# Patient Record
Sex: FEMALE | Race: WHITE | Employment: OTHER | URBAN - METROPOLITAN AREA
[De-identification: names, ages, dates, MRNs, and addresses within clinical notes are randomized per-mention and may not be internally consistent; named-entity substitution may affect disease eponyms.]

---

## 2017-08-21 ENCOUNTER — TRANSFERRED RECORDS (OUTPATIENT)
Dept: HEALTH INFORMATION MANAGEMENT | Facility: CLINIC | Age: 47
End: 2017-08-21

## 2017-08-21 LAB — PAP SMEAR - HIM PATIENT REPORTED: NEGATIVE

## 2017-09-01 ENCOUNTER — OFFICE VISIT (OUTPATIENT)
Dept: FAMILY MEDICINE | Facility: CLINIC | Age: 47
End: 2017-09-01
Payer: COMMERCIAL

## 2017-09-01 ENCOUNTER — DOCUMENTATION ONLY (OUTPATIENT)
Dept: LAB | Facility: CLINIC | Age: 47
End: 2017-09-01

## 2017-09-01 VITALS
DIASTOLIC BLOOD PRESSURE: 106 MMHG | WEIGHT: 195.4 LBS | HEIGHT: 65 IN | TEMPERATURE: 97.8 F | BODY MASS INDEX: 32.55 KG/M2 | OXYGEN SATURATION: 97 % | HEART RATE: 79 BPM | SYSTOLIC BLOOD PRESSURE: 160 MMHG

## 2017-09-01 DIAGNOSIS — I10 ESSENTIAL HYPERTENSION: Primary | ICD-10-CM

## 2017-09-01 DIAGNOSIS — L65.9 ALOPECIA: ICD-10-CM

## 2017-09-01 DIAGNOSIS — D69.9 TENDENCY TOWARD BLEEDING EASILY (H): ICD-10-CM

## 2017-09-01 LAB
BASOPHILS # BLD AUTO: 0.1 10E9/L (ref 0–0.2)
BASOPHILS NFR BLD AUTO: 0.7 %
DIFFERENTIAL METHOD BLD: NORMAL
EOSINOPHIL # BLD AUTO: 0.2 10E9/L (ref 0–0.7)
EOSINOPHIL NFR BLD AUTO: 1.8 %
ERYTHROCYTE [DISTWIDTH] IN BLOOD BY AUTOMATED COUNT: 13 % (ref 10–15)
HCT VFR BLD AUTO: 37.5 % (ref 35–47)
HGB BLD-MCNC: 12.3 G/DL (ref 11.7–15.7)
LYMPHOCYTES # BLD AUTO: 2.6 10E9/L (ref 0.8–5.3)
LYMPHOCYTES NFR BLD AUTO: 27.6 %
MCH RBC QN AUTO: 29.4 PG (ref 26.5–33)
MCHC RBC AUTO-ENTMCNC: 32.8 G/DL (ref 31.5–36.5)
MCV RBC AUTO: 90 FL (ref 78–100)
MONOCYTES # BLD AUTO: 0.7 10E9/L (ref 0–1.3)
MONOCYTES NFR BLD AUTO: 7.5 %
NEUTROPHILS # BLD AUTO: 5.9 10E9/L (ref 1.6–8.3)
NEUTROPHILS NFR BLD AUTO: 62.4 %
PLATELET # BLD AUTO: 324 10E9/L (ref 150–450)
RBC # BLD AUTO: 4.19 10E12/L (ref 3.8–5.2)
WBC # BLD AUTO: 9.5 10E9/L (ref 4–11)

## 2017-09-01 PROCEDURE — 80048 BASIC METABOLIC PNL TOTAL CA: CPT | Performed by: INTERNAL MEDICINE

## 2017-09-01 PROCEDURE — 36415 COLL VENOUS BLD VENIPUNCTURE: CPT | Performed by: INTERNAL MEDICINE

## 2017-09-01 PROCEDURE — 83550 IRON BINDING TEST: CPT | Performed by: INTERNAL MEDICINE

## 2017-09-01 PROCEDURE — 85730 THROMBOPLASTIN TIME PARTIAL: CPT | Performed by: INTERNAL MEDICINE

## 2017-09-01 PROCEDURE — 85610 PROTHROMBIN TIME: CPT | Performed by: INTERNAL MEDICINE

## 2017-09-01 PROCEDURE — 99203 OFFICE O/P NEW LOW 30 MIN: CPT | Performed by: INTERNAL MEDICINE

## 2017-09-01 PROCEDURE — 84443 ASSAY THYROID STIM HORMONE: CPT | Performed by: INTERNAL MEDICINE

## 2017-09-01 PROCEDURE — 85025 COMPLETE CBC W/AUTO DIFF WBC: CPT | Performed by: INTERNAL MEDICINE

## 2017-09-01 PROCEDURE — 83540 ASSAY OF IRON: CPT | Performed by: INTERNAL MEDICINE

## 2017-09-01 PROCEDURE — 82728 ASSAY OF FERRITIN: CPT | Performed by: INTERNAL MEDICINE

## 2017-09-01 RX ORDER — VENLAFAXINE HYDROCHLORIDE 150 MG/1
150 CAPSULE, EXTENDED RELEASE ORAL DAILY
Qty: 90 CAPSULE | Refills: 1 | COMMUNITY
Start: 2017-09-01 | End: 2018-02-28 | Stop reason: DRUGHIGH

## 2017-09-01 RX ORDER — ARIPIPRAZOLE 15 MG/1
15 TABLET ORAL AT BEDTIME
Qty: 30 TABLET | Refills: 1 | COMMUNITY
Start: 2017-09-01

## 2017-09-01 ASSESSMENT — ENCOUNTER SYMPTOMS
BLOOD IN STOOL: 0
NAUSEA: 0
DIZZINESS: 0
SENSORY CHANGE: 0
VOMITING: 0
HEMATURIA: 0
SHORTNESS OF BREATH: 0
PND: 0
WEIGHT LOSS: 0
COUGH: 0
ORTHOPNEA: 0
ABDOMINAL PAIN: 0
PALPITATIONS: 0
BRUISES/BLEEDS EASILY: 1
HEADACHES: 0
FOCAL WEAKNESS: 0

## 2017-09-01 NOTE — PROGRESS NOTES
Lab received order for cbc, INR, and PTT. Unfortunately these orders were ordered a little after the previous ones, so we were unable to obtain specimens for these tests.   Please call patient to make a lab only apt if these orders are still needed.    Thanks

## 2017-09-01 NOTE — Clinical Note
Please abstract the following data from this visit with this patient into the appropriate field in Epic:  Pap smear done on this date: 8/21/17 (approximately), by this group: [facility], results were Normal.

## 2017-09-01 NOTE — PROGRESS NOTES
"PGEN study visit 1 NEW HYPERTENSION diagnosis (enrollment visit note)      SUBJECTIVE:  [facility]ans is [facility]. Please refer to research tab in the header and today's flow sheet for further details. Patient has new onset HYPERTENSION and has a goal of <140/90 (per Problem list target chosen by PCP)     PCP note reviewed. Patient is not on blood pressure medication(s) at this time.       BP Readings from Last 3 Encounters:   09/01/17 (!) 158/92       Current Outpatient Prescriptions   Medication Sig Dispense Refill     venlafaxine (EFFEXOR-XR) 150 MG 24 hr capsule Take 1 capsule (150 mg) by mouth daily 90 capsule 1     ARIPiprazole (ABILIFY) 15 MG tablet Take 1 tablet (15 mg) by mouth At Bedtime 30 tablet 1     Potassium   Date Value Ref Range Status   11/06/2009 3.6 3.4 - 5.3 mmol/L Final     Creatinine   Date Value Ref Range Status   11/08/2009 0.58 0.52 - 1.04 mg/dL Final     Comment:     New IDMS-traceable calibration  beginning 5/1/08     Urea Nitrogen   Date Value Ref Range Status   11/06/2009 7 5 - 24 mg/dL Final     GFR Estimate   Date Value Ref Range Status   11/08/2009 >90 >60 mL/min/1.7m2 Final      A baseline potassium, creatinine, BUN, GFR has not been done within past 12 months      OBJECTIVE:  Patient in in no apparent distress and able to provide full history for today's encounter. she denies pain or any current illness   Vitals: BP (!) 158/92 (BP Location: Left arm, Patient Position: Chair, Cuff Size: Adult Large)  Pulse 79  Temp 97.8  F (36.6  C) (Tympanic)  Ht 5' 4.5\" (1.638 m)  Wt 195 lb 6.4 oz (88.6 kg)  SpO2 97%  BMI 33.02 kg/m2  Today's BP completed using cuff size: regular on left side  arm.    Pulse Readings from Last 1 Encounters:   09/01/17 79     Is pulse 55 or greater? - Yes    BMI= Body mass index is 33.02 kg/(m^2).  See PGEN flow sheet for other exam findings.        ASSESSMENT/PLAN  (I10) Hypertension goal BP (blood pressure) < 140/90 (primary " encounter diagnosis)      Conducted first visit according to protocol. Explained research study and provided consent and HIPPA forms.  Patient consented to study with me as well as visit coordinator on the phone with us today.      Genetic samples using standard PGEN protocol were obtained and left with [facility] lab team. This will be picked up today.     Medications were NOT prescribed today. We will advise or Rx via [employer] in 2 weeks when result and genetic profile/randomization is complete.  Rx will be chosen based on genetic profile if patient is randomized to intervention group.  If patient is randomized to control group Rx will be chosen based on JNC 8 guidelines.     Patient should NOT be provided genetic profile results until the end of the study (this is a single blinded study.  Clinicians will use genetic profile (see media tab) results to chose order of blood pressure medications in patients randomized to the intervention arm.   Patients will remain blinded to results until the end of the study)    See avs for more details.  Full research packet also provide to patient .  Our research team will reach out to patient to schedule follow up visit as well.     Education:  written materials handout and general discussion/verbal explanation  Limit dietary sodium intake between 1500-2000mg every day  Regular aerobic exercise.  Discussed habit change regarding diet/weight loss  Education material provided and patient was given an opportunity to ask questions.    Patient verbalized understanding of this plan and is agreeable to continuing with this research study    Ortega Mooney

## 2017-09-01 NOTE — PROGRESS NOTES
"HPI Comments:   Patient comes in today to discuss multiple concerns.    Patient has noted that her blood pressure has been elevated whenever she checks-in at the dentist or at her weight loss management program. Has not been diagnosed in the past with hypertension. States that her mother has hypertension and is on medications for it.    Has also noticed diffuse hair loss.  Denies scalp pain, itching, rashes.    When her cheek was being swabbed for the PGen study, it bled easily and the patient states that she has a tendency towards bleeding easily but denies bleeding in her stool or urine.      Family History   Problem Relation Age of Onset     Hypertension Mother        Review of Systems   Constitutional: Positive for malaise/fatigue. Negative for weight loss.   HENT: Negative for nosebleeds.    Respiratory: Negative for cough and shortness of breath.    Cardiovascular: Negative for chest pain, palpitations, orthopnea, leg swelling and PND.   Gastrointestinal: Negative for abdominal pain, blood in stool, melena, nausea and vomiting.   Genitourinary: Negative for hematuria.   Neurological: Negative for dizziness, sensory change, focal weakness and headaches.   Endo/Heme/Allergies: Bruises/bleeds easily.       BP (!) 160/106 (BP Location: Right arm)  Pulse 79  Temp 97.8  F (36.6  C) (Tympanic)  Ht 5' 4.5\" (1.638 m)  Wt 195 lb 6.4 oz (88.6 kg)  SpO2 97%  BMI 33.02 kg/m2      Physical Exam   Constitutional: She is oriented to person, place, and time. No distress.   HENT:   No scalp erythema, scaling, rashes   Neck: No thyromegaly present.   Cardiovascular: Normal rate, regular rhythm and normal heart sounds.    Pulmonary/Chest: Effort normal and breath sounds normal. No respiratory distress.   Musculoskeletal: She exhibits no edema.   Neurological: She is alert and oriented to person, place, and time. Coordination normal. GCS score is 15.   Nursing note and vitals reviewed.      (I10) Essential hypertension  " (primary encounter diagnosis)  Comment: Patient enrolled in PGen study and will wait for results  Plan: Basic metabolic panel            (D69.9) Tendency toward bleeding easily (H)  Comment:   Plan: CBC with platelets differential, INR, Partial         thromboplastin time            (L65.9) Alopecia  Comment:   Plan: TSH with free T4 reflex, Ferritin, Iron and         iron binding capacity

## 2017-09-01 NOTE — NURSING NOTE
"Chief Complaint   Patient presents with     Hypertension     Breathing Problem     Snoring        Initial BP (!) 158/92 (BP Location: Left arm, Patient Position: Chair, Cuff Size: Adult Large)  Pulse 79  Temp 97.8  F (36.6  C) (Tympanic)  Ht 5' 4.5\" (1.638 m)  Wt 195 lb 6.4 oz (88.6 kg)  SpO2 97%  BMI 33.02 kg/m2 Estimated body mass index is 33.02 kg/(m^2) as calculated from the following:    Height as of this encounter: 5' 4.5\" (1.638 m).    Weight as of this encounter: 195 lb 6.4 oz (88.6 kg)..    BP completed using cuff size: large  MEDICATIONS REVIEWED  SOCIAL AND FAMILY HX REVIEWED  Helen Henderson CMA  "

## 2017-09-01 NOTE — PATIENT INSTRUCTIONS
"PGEN study visit 1 NEW HYPERTENSION diagnosis (enrollment visit note)      SUBJECTIVE:  Evans is here for first [facility] research study visit. Please refer to research tab in the header and today's flow sheet for further details. Patient has new onset HYPERTENSION and has a goal of <140/90 (per Problem list target chosen by PCP)     PCP note reviewed. Patient is not on blood pressure medication(s) at this time.       BP Readings from Last 3 Encounters:   09/01/17 (!) 160/106       Current Outpatient Prescriptions   Medication Sig Dispense Refill     venlafaxine (EFFEXOR-XR) 150 MG 24 hr capsule Take 1 capsule (150 mg) by mouth daily 90 capsule 1     ARIPiprazole (ABILIFY) 15 MG tablet Take 1 tablet (15 mg) by mouth At Bedtime 30 tablet 1     Potassium   Date Value Ref Range Status   11/06/2009 3.6 3.4 - 5.3 mmol/L Final     Creatinine   Date Value Ref Range Status   11/08/2009 0.58 0.52 - 1.04 mg/dL Final     Comment:     New IDMS-traceable calibration  beginning 5/1/08     Urea Nitrogen   Date Value Ref Range Status   11/06/2009 7 5 - 24 mg/dL Final     GFR Estimate   Date Value Ref Range Status   11/08/2009 >90 >60 mL/min/1.7m2 Final      A baseline potassium, creatinine, BUN, GFR has not been done within past 12 months      OBJECTIVE:  Patient in in no apparent distress and able to provide full history for today's encounter. she denies pain or any current illness   Vitals: BP (!) 160/106 (BP Location: Right arm)  Pulse 79  Temp 97.8  F (36.6  C) (Tympanic)  Ht 5' 4.5\" (1.638 m)  Wt 195 lb 6.4 oz (88.6 kg)  SpO2 97%  BMI 33.02 kg/m2  Today's BP completed using cuff size: regular on both sides arm.    Pulse Readings from Last 1 Encounters:   09/01/17 79     Is pulse 55 or greater? - Yes    BMI= Body mass index is 33.02 kg/(m^2).  See PGEN flow sheet for other exam findings.          ASSESSMENT/PLAN  (I10) Hypertension goal BP (blood pressure) < 140/90 (primary encounter diagnosis)      Conducted first visit " according to protocol. Explained research study and provided consent and HIPPA forms.  Patient consented to study with me as well as visit coordinator on the phone with us today.      Genetic samples using standard PGEN protocol were obtained and left with [facility] lab team. This will be picked up today.     Medications were NOT prescribed today. We will advise or Rx via [employer] in 2 weeks when result and genetic profile/randomization is complete.  Rx will be chosen based on genetic profile if patient is randomized to intervention group.  If patient is randomized to control group Rx will be chosen based on JNC 8 guidelines.     Patient should NOT be provided genetic profile results until the end of the study (this is a single blinded study.  Clinicians will use genetic profile (see media tab) results to chose order of blood pressure medications in patients randomized to the intervention arm.   Patients will remain blinded to results until the end of the study)    See avs for more details.  Full research packet also provide to patient .  Our research team will reach out to patient to schedule follow up visit as well.     Education:  written materials handout and general discussion/verbal explanation  Limit dietary sodium intake between 1500-2000mg every day  Regular aerobic exercise.  Discussed habit change regarding limit sodium   Education material provided and patient was given an opportunity to ask questions.    Patient verbalized understanding of this plan and is agreeable to continuing with this research study

## 2017-09-01 NOTE — MR AVS SNAPSHOT
"              After Visit Summary   9/1/2017    Evans Salinas    MRN: [id number]           Patient Information     Date Of Birth          1970        Visit Information        Provider Department      9/1/2017 10:30 AM Ortega Mooney MD [facility]        Today's Diagnoses     Essential hypertension    -  1      Care Instructions    PGEN study visit 1 NEW HYPERTENSION diagnosis (enrollment visit note)      SUBJECTIVE:  Evans is here for first PGEN research study visit. Please refer to research tab in the header and today's flow sheet for further details. Patient has new onset HYPERTENSION and has a goal of <140/90 (per Problem list target chosen by PCP)     PCP note reviewed. Patient is not on blood pressure medication(s) at this time.       BP Readings from Last 3 Encounters:   09/01/17 (!) 160/106       Current Outpatient Prescriptions   Medication Sig Dispense Refill     venlafaxine (EFFEXOR-XR) 150 MG 24 hr capsule Take 1 capsule (150 mg) by mouth daily 90 capsule 1     ARIPiprazole (ABILIFY) 15 MG tablet Take 1 tablet (15 mg) by mouth At Bedtime 30 tablet 1     Potassium   Date Value Ref Range Status   11/06/2009 3.6 3.4 - 5.3 mmol/L Final     Creatinine   Date Value Ref Range Status   11/08/2009 0.58 0.52 - 1.04 mg/dL Final     Comment:     New IDMS-traceable calibration  beginning 5/1/08     Urea Nitrogen   Date Value Ref Range Status   11/06/2009 7 5 - 24 mg/dL Final     GFR Estimate   Date Value Ref Range Status   11/08/2009 >90 >60 mL/min/1.7m2 Final      A baseline potassium, creatinine, BUN, GFR has not been done within past 12 months      OBJECTIVE:  Patient in in no apparent distress and able to provide full history for today's encounter. she denies pain or any current illness   Vitals: BP (!) 160/106 (BP Location: Right arm)  Pulse 79  Temp 97.8  F (36.6  C) (Tympanic)  Ht 5' 4.5\" (1.638 m)  Wt 195 lb 6.4 oz (88.6 kg)  SpO2 97%  BMI 33.02 kg/m2  Today's BP " completed using cuff size: regular on both sides arm.    Pulse Readings from Last 1 Encounters:   09/01/17 79     Is pulse 55 or greater? - Yes    BMI= Body mass index is 33.02 kg/(m^2).  See PGEN flow sheet for other exam findings.          ASSESSMENT/PLAN  (I10) Hypertension goal BP (blood pressure) < 140/90 (primary encounter diagnosis)      Conducted first visit according to protocol. Explained research study and provided consent and HIPPA forms.  Patient consented to study with me as well as visit coordinator on the phone with us today.      Genetic samples using standard PGEN protocol were obtained and left with [facility] lab team. This will be picked up today.     Medications were NOT prescribed today. We will advise or Rx via [employer] in 2 weeks when result and genetic profile/randomization is complete.  Rx will be chosen based on genetic profile if patient is randomized to intervention group.  If patient is randomized to control group Rx will be chosen based on JNC 8 guidelines.     Patient should NOT be provided genetic profile results until the end of the study (this is a single blinded study.  Clinicians will use genetic profile (see media tab) results to chose order of blood pressure medications in patients randomized to the intervention arm.   Patients will remain blinded to results until the end of the study)    See avs for more details.  Full research packet also provide to patient .  Our research team will reach out to patient to schedule follow up visit as well.     Education:  written materials handout and general discussion/verbal explanation  Limit dietary sodium intake between 1500-2000mg every day  Regular aerobic exercise.  Discussed habit change regarding limit sodium   Education material provided and patient was given an opportunity to ask questions.    Patient verbalized understanding of this plan and is agreeable to continuing with this research study                 "Follow-ups after your visit        Who to contact     If you have questions or need follow up information about today's clinic visit or your schedule please contact [facility] directly at [phone].  Normal or non-critical lab and imaging results will be communicated to you by MyChart, letter or phone within 4 business days after the clinic has received the results. If you do not hear from us within 7 days, please contact the clinic through MyChart or phone. If you have a critical or abnormal lab result, we will notify you by phone as soon as possible.  Submit refill requests through [employer] or call your pharmacy and they will forward the refill request to us. Please allow 3 business days for your refill to be completed.          Additional Information About Your Visit        [employer] Information     [employer] lets you send messages to your doctor, view your test results, renew your prescriptions, schedule appointments and more. To sign up, go to [email or web address] . Click on \"Log in\" on the left side of the screen, which will take you to the Welcome page. Then click on \"Sign up Now\" on the right side of the page.     You will be asked to enter the access code listed below, as well as some personal information. Please follow the directions to create your username and password.     Your access code is: [email or web address]  Expires: 2017 11:22 AM     Your access code will  in 90 days. If you need help or a new code, please call your [place] clinic or [phone].        Care EveryWhere ID     This is your Care EveryWhere ID. This could be used by other organizations to access your [place] medical records  [id number]        Your Vitals Were     Pulse Temperature Height Pulse Oximetry BMI (Body Mass Index)       79 97.8  F (36.6  C) (Tympanic) 5' 4.5\" (1.638 m) 97% 33.02 kg/m2        Blood Pressure from Last 3 Encounters:   17 (!) 160/106    Weight from Last 3 Encounters:   17 195 " lb 6.4 oz (88.6 kg)              Today, you had the following     No orders found for display       Primary Care Provider    None , MD       No address on file        Equal Access to Services     CARL MARTIMOI : Francine brandon miranda judith Ackerman, dashawn ramonfinn, bo kavikki reyes, samuel geurra. So [facility] [phone].    ATENCIÓN: Si habla español, tiene a goldstein disposición servicios gratuitos de asistencia lingüística. Llame al [phone].    We comply with applicable federal civil rights laws and [place] laws. We do not discriminate on the basis of race, color, national origin, age, disability sex, sexual orientation or gender identity.            Thank you!     Thank you for choosing [facility]  for your care. Our goal is always to provide you with excellent care. Hearing back from our patients is one way we can continue to improve our services. Please take a few minutes to complete the written survey that you may receive in the mail after your visit with us. Thank you!             Your Updated Medication List - Protect others around you: Learn how to safely use, store and throw away your medicines at www.disposemymeds.org.          This list is accurate as of: 9/1/17 11:33 AM.  Always use your most recent med list.                   Brand Name Dispense Instructions for use Diagnosis    ABILIFY 15 MG tablet   Generic drug:  ARIPiprazole     30 tablet    Take 1 tablet (15 mg) by mouth At Bedtime        venlafaxine 150 MG 24 hr capsule    EFFEXOR-XR    90 capsule    Take 1 capsule (150 mg) by mouth daily

## 2017-09-02 LAB
ANION GAP SERPL CALCULATED.3IONS-SCNC: 6 MMOL/L (ref 3–14)
APTT PPP: 31 SEC (ref 22–37)
BUN SERPL-MCNC: 8 MG/DL (ref 7–30)
CALCIUM SERPL-MCNC: 9.1 MG/DL (ref 8.5–10.1)
CHLORIDE SERPL-SCNC: 106 MMOL/L (ref 94–109)
CO2 SERPL-SCNC: 29 MMOL/L (ref 20–32)
CREAT SERPL-MCNC: 0.91 MG/DL (ref 0.52–1.04)
FERRITIN SERPL-MCNC: 14 NG/ML (ref 8–252)
GFR SERPL CREATININE-BSD FRML MDRD: 66 ML/MIN/1.7M2
GLUCOSE SERPL-MCNC: 80 MG/DL (ref 70–99)
INR PPP: 1.08 (ref 0.86–1.14)
IRON SATN MFR SERPL: 16 % (ref 15–46)
IRON SERPL-MCNC: 48 UG/DL (ref 35–180)
POTASSIUM SERPL-SCNC: 3.9 MMOL/L (ref 3.4–5.3)
SODIUM SERPL-SCNC: 141 MMOL/L (ref 133–144)
TIBC SERPL-MCNC: 308 UG/DL (ref 240–430)
TSH SERPL DL<=0.005 MIU/L-ACNC: 3.81 MU/L (ref 0.4–4)

## 2017-09-22 DIAGNOSIS — I10 ESSENTIAL HYPERTENSION: Primary | ICD-10-CM

## 2017-09-22 RX ORDER — LISINOPRIL/HYDROCHLOROTHIAZIDE 10-12.5 MG
1 TABLET ORAL DAILY
Qty: 30 TABLET | Refills: 1 | Status: SHIPPED | OUTPATIENT
Start: 2017-09-22 | End: 2017-10-11 | Stop reason: ALTCHOICE

## 2017-10-11 ENCOUNTER — ALLIED HEALTH/NURSE VISIT (OUTPATIENT)
Dept: FAMILY MEDICINE | Facility: CLINIC | Age: 47
End: 2017-10-11

## 2017-10-11 VITALS
DIASTOLIC BLOOD PRESSURE: 74 MMHG | HEART RATE: 74 BPM | WEIGHT: 193.5 LBS | SYSTOLIC BLOOD PRESSURE: 108 MMHG | BODY MASS INDEX: 32.24 KG/M2 | HEIGHT: 65 IN

## 2017-10-11 DIAGNOSIS — I10 ESSENTIAL HYPERTENSION: Primary | ICD-10-CM

## 2017-10-11 PROCEDURE — 99213 OFFICE O/P EST LOW 20 MIN: CPT | Performed by: INTERNAL MEDICINE

## 2017-10-11 RX ORDER — LISINOPRIL 10 MG/1
10 TABLET ORAL DAILY
Qty: 30 TABLET | Refills: 1 | Status: SHIPPED | OUTPATIENT
Start: 2017-10-11 | End: 2017-12-22

## 2017-10-11 NOTE — PATIENT INSTRUCTIONS
STOP taking the Lisinopril/Hydrochlorothiazide.    Start plain Lisinopril.  Call doctor if you develop any side effects from the medication.    Follow up in 2 weeks.

## 2017-10-11 NOTE — MR AVS SNAPSHOT
After Visit Summary   10/11/2017    Evans Salinas    MRN: [id number]           Patient Information     Date Of Birth          1970        Visit Information        Provider Department      10/11/2017 9:45 AM Ortega Mooney MD [facility]        Today's Diagnoses     Essential hypertension    -  1      Care Instructions    STOP taking the Lisinopril/Hydrochlorothiazide.    Start plain Lisinopril.  Call doctor if you develop any side effects from the medication.    Follow up in 2 weeks.          Follow-ups after your visit        Your next 10 appointments already scheduled     Nov 08, 2017  9:30 AM CST   Nurse Only with Ortega Mooney [facility]   [facility]    [address] [facility] [address] [address]   [phone]            Dec 06, 2017  9:30 AM CST   Nurse Only with Ortega Mooney MD   [facility]    [address] [address]   [phone]            Jan 17, 2018  8:45 AM CST   Nurse Only with Ortega Mooney MD   [facility]    [address] [place] [address]   [phone]            Feb 28, 2018  8:45 AM CST   Nurse Only with Ortega Mooney MD   [facility]    [address] [address]   [phone]            May 23, 2018  8:45 AM CDT   Nurse Only with Ortega Mooney MD   [facility]    [address] [address]   [phone]            Sep 12, 2018  8:45 AM CDT   Nurse Only with Ortega Mooney MD   [facility]    [address] [place] [address]   [phone]              Who to contact     If you have questions or need follow up information about today's clinic visit or  "your schedule please contact [facility] directly at [phone].  Normal or non-critical lab and imaging results will be communicated to you by MyChart, letter or phone within 4 business days after the clinic has received the results. If you do not hear from us within 7 days, please contact the clinic through [employer] or phone. If you have a critical or abnormal lab result, we will notify you by phone as soon as possible.  Submit refill requests through [employer] or call your pharmacy and they will forward the refill request to us. Please allow 3 business days for your refill to be completed.          Additional Information About Your Visit        [employer] Information     [employer] gives you secure access to your electronic health record. If you see a primary care provider, you can also send messages to your care team and make appointments. If you have questions, please call your primary care clinic.  If you do not have a primary care provider, please call [phone] and they will assist you.        Care EveryWhere ID     This is your Care EveryWhere ID. This could be used by other organizations to access your [facility] medical records  [id number]        Your Vitals Were     Pulse Height Breastfeeding? BMI (Body Mass Index)          74 5' 4.5\" (1.638 m) No 32.7 kg/m2         Blood Pressure from Last 3 Encounters:   10/11/17 108/74   09/01/17 (!) 160/106    Weight from Last 3 Encounters:   10/11/17 193 lb 8 oz (87.8 kg)   09/01/17 195 lb 6.4 oz (88.6 kg)              Today, you had the following     No orders found for display         Today's Medication Changes          These changes are accurate as of: 10/11/17 10:34 AM.  If you have any questions, ask your nurse or doctor.               Start taking these medicines.        Dose/Directions    lisinopril 10 MG tablet   Commonly known as:  PRINIVIL/ZESTRIL   Used for:  Essential hypertension   Started by:  Ortega Mooney MD        Dose:  " 10 mg   Take 1 tablet (10 mg) by mouth daily   Quantity:  30 tablet   Refills:  1         Stop taking these medicines if you haven't already. Please contact your care team if you have questions.     lisinopril-hydrochlorothiazide 10-12.5 MG per tablet   Commonly known as:  PRINZIDE/ZESTORETIC   Stopped by:  Ortega Mooney MD                Where to get your medicines      These medications were sent to [facility] [address] [facility], [place] - [address]  [address] [address]     Phone:  [phone]     lisinopril 10 MG tablet                Primary Care Provider    None Specified       No primary provider on file.        Equal Access to Services     [facility]: Hadjac Ackerman, waeduardo burton, bo kaalmada amy, samuel cox . So [facility] [phone].    ATENCIÓN: Si habla español, tiene a goldstein disposición servicios gratuitos de asistencia lingüística. Llame al [phone].    We comply with applicable federal civil rights laws and [place] laws. We do not discriminate on the basis of race, color, national origin, age, disability, sex, sexual orientation, or gender identity.            Thank you!     Thank you for choosing [facility]  for your care. Our goal is always to provide you with excellent care. Hearing back from our patients is one way we can continue to improve our services. Please take a few minutes to complete the written survey that you may receive in the mail after your visit with us. Thank you!             Your Updated Medication List - Protect others around you: Learn how to safely use, store and throw away your medicines at www.disposemymeds.org.          This list is accurate as of: 10/11/17 10:34 AM.  Always use your most recent med list.                   Brand Name Dispense Instructions for use Diagnosis    ABILIFY 15 MG tablet   Generic drug:  ARIPiprazole     30 tablet    Take 1 tablet  (15 mg) by mouth At Bedtime        lisinopril 10 MG tablet    PRINIVIL/ZESTRIL    30 tablet    Take 1 tablet (10 mg) by mouth daily    Essential hypertension       venlafaxine 150 MG 24 hr capsule    EFFEXOR-XR    90 capsule    Take 1 capsule (150 mg) by mouth daily

## 2017-10-11 NOTE — PROGRESS NOTES
PGEN study visit  NEW HYPERTENSION diagnosis visit 2    SUBJECTIVE:  Evans is here for 2nd PGEN research study visit. Please refer to research tab in the header and today's flow sheet for further details. Patient had new onset hypertension at enrollment and has a goal of <140/90 (per Problem list target chosen by PCP)     Prior research note reviewed. Patient is on blood pressure medication(s) at this time.  She has been taking lisinopril/hydrochlorothiazide 12.5 mg per day.   She has noted that ever since she started taking the medication, she would have frequent episodes of waking up at night. Otherwise, no other side effects experienced.      Current diet & lifestyle: still without sodium restriction  Smoking: none  Alcohol consumption: none  Other pertinent history: none     BP Readings from Last 3 Encounters:   10/11/17 108/74   09/01/17 (!) 160/106       Current Outpatient Prescriptions   Medication Sig Dispense Refill     lisinopril-hydrochlorothiazide (PRINZIDE/ZESTORETIC) 10-12.5 MG per tablet Take 1 tablet by mouth daily 30 tablet 1     venlafaxine (EFFEXOR-XR) 150 MG 24 hr capsule Take 1 capsule (150 mg) by mouth daily 90 capsule 1     ARIPiprazole (ABILIFY) 15 MG tablet Take 1 tablet (15 mg) by mouth At Bedtime 30 tablet 1     Potassium   Date Value Ref Range Status   09/01/2017 3.9 3.4 - 5.3 mmol/L Final     Creatinine   Date Value Ref Range Status   09/01/2017 0.91 0.52 - 1.04 mg/dL Final     Urea Nitrogen   Date Value Ref Range Status   09/01/2017 8 7 - 30 mg/dL Final     GFR Estimate   Date Value Ref Range Status   09/01/2017 66 >60 mL/min/1.7m2 Final     Comment:     Non  GFR Calc      A baseline potassium, creatinine, BUN, GFR has been done within past 12 months      OBJECTIVE:  Patient in in no apparent distress and able to provide full history for today's encounter. she denies pain or any current illness   Vitals: /74 (BP Location: Right arm, Patient Position: Sitting, Cuff  "Size: Adult Regular)  Pulse 74  Ht 5' 4.5\" (1.638 m)  Wt 193 lb 8 oz (87.8 kg)  Breastfeeding? No  BMI 32.7 kg/m2  Today's BP completed using cuff size: regular on right side arm.      Pulse Readings from Last 1 Encounters:   10/11/17 74     Is pulse 55 or greater? - Yes    BMI= Body mass index is 32.7 kg/(m^2).  See [facility] flow sheet for other exam findings.       ASSESSMENT/PLAN:   Blood pressure reading today is at the provider specified goal of <140/90.      [facility]  '[facility]' ) for this visit (this saves flowsheet data)      1.  I have changed the patient's regimen from lisinopril/hydrochlorothiazide to plain lisinopril given the potential side effect of restlessness (which is a listed side effect and could be causing her frequent nighttime awakenings) with the hydrochlorothiazide   2.  We will not be checking a metabolic lab panel today.      3.  Follow up instructions include:     Next Provider visit: Follow up in 2 weeks..    See avs for more details.  Full research packet also provided to patient previously  Our research team will reach out to patient to schedule follow up visit as well.     Patient was counseled regarding the lifestyle changes (listed below)  to help with BP management Yes  Lifestyle changes that can help control high blood pressure:  Even though [facility] is a study to test effectiveness of genetically guided medications for managing high blood pressure, there are several things you can do to ensure your blood pressure stays in good control:    Maintain a healthy weight (BMI<26). A modest amount of weight loss can be helpful    Limit salt intake to under 2400mg daily    Follow the DASH diet (lean meats, low salt, whole grains, lots of fruits/vegies)    Stay active, try to get in 30 minutes of exercise daily.    Manage your daily stress.    Do not smoke cigarettes (or cut back)    Limit alcohol (2 drinks/day for men, 1 drink/day for women)  Was AVS  provided to patient with the " relevant <dot>PGENPI dot phrase pulled into patient instructions Yes    Ortega Mooney MD

## 2017-11-08 ENCOUNTER — ALLIED HEALTH/NURSE VISIT (OUTPATIENT)
Dept: FAMILY MEDICINE | Facility: CLINIC | Age: 47
End: 2017-11-08
Payer: COMMERCIAL

## 2017-11-08 VITALS
DIASTOLIC BLOOD PRESSURE: 88 MMHG | SYSTOLIC BLOOD PRESSURE: 132 MMHG | RESPIRATION RATE: 16 BRPM | HEART RATE: 75 BPM | HEIGHT: 65 IN | OXYGEN SATURATION: 97 % | BODY MASS INDEX: 32.65 KG/M2 | TEMPERATURE: 98 F | WEIGHT: 196 LBS

## 2017-11-08 DIAGNOSIS — I10 ESSENTIAL HYPERTENSION: Primary | ICD-10-CM

## 2017-11-08 LAB
ANION GAP SERPL CALCULATED.3IONS-SCNC: 8 MMOL/L (ref 3–14)
BUN SERPL-MCNC: 9 MG/DL (ref 7–30)
CALCIUM SERPL-MCNC: 9.3 MG/DL (ref 8.5–10.1)
CHLORIDE SERPL-SCNC: 105 MMOL/L (ref 94–109)
CO2 SERPL-SCNC: 29 MMOL/L (ref 20–32)
CREAT SERPL-MCNC: 0.84 MG/DL (ref 0.52–1.04)
GFR SERPL CREATININE-BSD FRML MDRD: 73 ML/MIN/1.7M2
GLUCOSE SERPL-MCNC: 83 MG/DL (ref 70–99)
POTASSIUM SERPL-SCNC: 4.4 MMOL/L (ref 3.4–5.3)
SODIUM SERPL-SCNC: 142 MMOL/L (ref 133–144)

## 2017-11-08 PROCEDURE — 99213 OFFICE O/P EST LOW 20 MIN: CPT | Performed by: NURSE PRACTITIONER

## 2017-11-08 PROCEDURE — 36415 COLL VENOUS BLD VENIPUNCTURE: CPT | Performed by: NURSE PRACTITIONER

## 2017-11-08 PROCEDURE — 80048 BASIC METABOLIC PNL TOTAL CA: CPT | Performed by: NURSE PRACTITIONER

## 2017-11-08 NOTE — MR AVS SNAPSHOT
After Visit Summary   11/8/2017    Evans Salinas    MRN: [id number]           Patient Information     Date Of Birth          1970        Visit Information        Provider Department      11/8/2017 9:00 AM Luly Hutchins APRN CNP [facility]        Today's Diagnoses     Essential hypertension    -  1      Care Instructions    PGen Hypertension Study   Visit 3     Thank you for your continued participation in the hypertension study!  Please continue taking your hypertension medication as directed. Your next visit will be in four weeks and will be scheduled for you in the coming days. After scheduled, be sure to let the research coordinator know as soon as possible if you cannot make it so that the visit can be rescheduled for you.     Please contact the research coordinator if you receive care for your hypertension outside of your study visits.    If you have any questions, please contact the research coordinator at [phone]. If you experience any symptoms please call the [place] 24/7 triage number at [phone]. If your phone number, email or address changes please alert the research coordinator.     In the meantime, we ask that you complete the online surveys emailed out to you, if completing online, or mailed out to you, if completing paper surveys, before your next visit.    Please restart your Buproprion and Aripiprazole at the same dose. Make an appointment with your doctor before making any changes           Follow-ups after your visit        Your next 10 appointments already scheduled     Dec 06, 2017  9:30 AM CST   Nurse Only with Ortega Mooney MD   [facility] ([facility])    [address] [address]   [phone]            Jan 17, 2018  8:45 AM CST   Nurse Only with Ortega Mooney MD   [facility] ([facility])    [address] [facility] [address]    [phone]            Feb 28, 2018  8:45 AM CST   Nurse Only with Ortega Mooney MD   [facility]    [address] [facility] [address] [phone]            May 23, 2018  8:45 AM CDT   Nurse Only with Ortega Mooney MD   [facility] ([facility])    [address] [facility] [address] [phone]            Sep 12, 2018  8:45 AM CDT   Nurse Only with Ortega Mooney MD   [facility] ([facility])    [address] [facility] [address] [phone]              Who to contact     If you have questions or need follow up information about today's clinic visit or your schedule please contact [facility] directly at [phone].  Normal or non-critical lab and imaging results will be communicated to you by [employer], letter or phone within 4 business days after the clinic has received the results. If you do not hear from us within 7 days, please contact the clinic through [employer] or phone. If you have a critical or abnormal lab result, we will notify you by phone as soon as possible.  Submit refill requests through [employer] or call your pharmacy and they will forward the refill request to us. Please allow 3 business days for your refill to be completed.          Additional Information About Your Visit        [employer] Information     [employer] gives you secure access to your electronic health record. If you see a primary care provider, you can also send messages to your care team and make appointments. If you have questions, please call your primary care clinic.  If you do not have a primary care provider, please call [phone] and they will assist you.        Care EveryWhere ID     This is your Care EveryWhere ID. This could be used by other organizations to access your [facility] medical records  [id number]        Your Vitals Were     Pulse  "Temperature Respirations Height Pulse Oximetry BMI (Body Mass Index)    75 98  F (36.7  C) (Oral) 16 5' 4.5\" (1.638 m) 97% 33.12 kg/m2       Blood Pressure from Last 3 Encounters:   11/08/17 132/88   10/11/17 108/74   09/01/17 (!) 160/106    Weight from Last 3 Encounters:   11/08/17 196 lb (88.9 kg)   10/11/17 193 lb 8 oz (87.8 kg)   09/01/17 195 lb 6.4 oz (88.6 kg)              We Performed the Following     Basic metabolic panel  (Ca, Cl, CO2, Creat, Gluc, K, Na, BUN)        Primary Care Provider    None Specified       No primary provider on file.        Equal Access to Services     [facility] : Francine martinezo Tyron, waaxda luqadaha, qaybta kaalmada adekavithayamicky, samuel cox . So [facility] [phone].    ATENCIÓN: Si habla español, tiene a goldstein disposición servicios gratuitos de asistencia lingüística. Llame al [phone].    We comply with applicable federal civil rights laws and [place] laws. We do not discriminate on the basis of race, color, national origin, age, disability, sex, sexual orientation, or gender identity.            Thank you!     Thank you for choosing [facility]  for your care. Our goal is always to provide you with excellent care. Hearing back from our patients is one way we can continue to improve our services. Please take a few minutes to complete the written survey that you may receive in the mail after your visit with us. Thank you!             Your Updated Medication List - Protect others around you: Learn how to safely use, store and throw away your medicines at www.disposemymeds.org.          This list is accurate as of: 11/8/17  9:23 AM.  Always use your most recent med list.                   Brand Name Dispense Instructions for use Diagnosis    ABILIFY 15 MG tablet   Generic drug:  ARIPiprazole     30 tablet    Take 1 tablet (15 mg) by mouth At Bedtime        lisinopril 10 MG tablet    PRINIVIL/ZESTRIL    30 tablet    Take 1 tablet (10 " mg) by mouth daily    Essential hypertension       venlafaxine 150 MG 24 hr capsule    EFFEXOR-XR    90 capsule    Take 1 capsule (150 mg) by mouth daily

## 2017-11-08 NOTE — PATIENT INSTRUCTIONS
[facility] Hypertension Study   Visit 3     Thank you for your continued participation in the hypertension study!  Please continue taking your hypertension medication as directed. Your next visit will be in four weeks and will be scheduled for you in the coming days. After scheduled, be sure to let the research coordinator know as soon as possible if you cannot make it so that the visit can be rescheduled for you.     Please contact the research coordinator if you receive care for your hypertension outside of your study visits.    If you have any questions, please contact the research coordinator at [phone]. If you experience any symptoms please call the [place] 24/7 triage number at [phone]. If your phone number, email or address changes please alert the research coordinator.     In the meantime, we ask that you complete the online surveys emailed out to you, if completing online, or mailed out to you, if completing paper surveys, before your next visit.    Please restart your Buproprion and Aripiprazole at the same dose. Make an appointment with your doctor before making any changes

## 2017-11-08 NOTE — PROGRESS NOTES
"HPI    SUBJECTIVE:   Evans Salinas is a 47 year old female who presents to clinic today for the following health issues:      Hypertension Follow-up      Outpatient blood pressures are not being checked.    Low Salt Diet: not monitoring salt        Amount of exercise or physical activity: Walking only    Problems taking medications regularly: No    Medication side effects: none    Diet: regular (no restrictions)        {additional problems for provider to add:914213}    Problem list and histories reviewed & adjusted, as indicated.  Additional history: {NONE - AS DOCUMENTED:992801::\"as documented\"}    {HIST REVIEW/ LINKS 2:190572}    Reviewed and updated as needed this visit by clinical staff     Reviewed and updated as needed this visit by Provider         {PROVIDER CHARTING PREFERENCE:674423}      ROS      Physical Exam      "

## 2017-11-08 NOTE — PROGRESS NOTES
"PGEN study visit  NEW HYPERTENSION diagnosis visit 3    SUBJECTIVE:  Evans is here for 3rd PGEN research study visit. Please refer to research tab in the header and today's flow sheet for further details. Patient had new onset hypertension at enrollment and has a goal of <  140/90 (per Problem list target chosen by PCP)     Prior research note reviewed. Patient is on blood pressure medication(s) at this time.  she has been taking Lisinopril and is tolerating the medication well.      Current diet & lifestyle: walks a couple times a week and no significant salt restriction  Smoking: none  Alcohol consumption none  Other pertinent history none     BP Readings from Last 3 Encounters:   10/11/17 108/74   09/01/17 (!) 160/106       Current Outpatient Prescriptions   Medication Sig Dispense Refill     lisinopril (PRINIVIL/ZESTRIL) 10 MG tablet Take 1 tablet (10 mg) by mouth daily 30 tablet 1     venlafaxine (EFFEXOR-XR) 150 MG 24 hr capsule Take 1 capsule (150 mg) by mouth daily 90 capsule 1     ARIPiprazole (ABILIFY) 15 MG tablet Take 1 tablet (15 mg) by mouth At Bedtime 30 tablet 1     Potassium   Date Value Ref Range Status   09/01/2017 3.9 3.4 - 5.3 mmol/L Final     Creatinine   Date Value Ref Range Status   09/01/2017 0.91 0.52 - 1.04 mg/dL Final     Urea Nitrogen   Date Value Ref Range Status   09/01/2017 8 7 - 30 mg/dL Final     GFR Estimate   Date Value Ref Range Status   09/01/2017 66 >60 mL/min/1.7m2 Final     Comment:     Non  GFR Calc      A baseline potassium, creatinine, BUN, GFR has been done within past 12 months      OBJECTIVE:  Patient in in no apparent distress and able to provide full history for today's encounter. she denies pain or any current illness   Vitals: /88 (BP Location: Right arm, Patient Position: Chair, Cuff Size: Adult Large)  Pulse 75  Temp 98  F (36.7  C) (Oral)  Resp 16  Ht 5' 4.5\" (1.638 m)  Wt 196 lb (88.9 kg)  SpO2 97%  BMI 33.12 kg/m2  Today's BP " completed using cuff size: large on right side arm.  NAD   Breathing effort normal   Normal mood     Pulse Readings from Last 1 Encounters:   11/08/17 75     Is pulse 55 or greater? - Yes    BMI= Body mass index is 33.12 kg/(m^2).  See PGEN flow sheet for other exam findings.       ASSESSMENT/PLAN:   Blood pressure reading today is at the provider specified goal of <140/90.      PGEN Flowsheet has been  'filed' ) for this visit (this saves flowsheet data)      1.  Based on PGEN RN HTN MGMT standing order the patient will be advised continue current medication regimen unchanged.     2.  We will be checking a metabolic lab panel today.      3.  Follow up instructions include:     Next Provider visit: Follow up in 1 month..    See avs for more details.  Full research packet also provided to patient previously  Our research team will reach out to patient to schedule follow up visit as well.     She was also instructed to restart her buproprion and aripiprazole as she just stopped cold turkey on her own. She should restart the same dose and f/u with her  provider before making any changes     Patient was counseled regarding the lifestyle changes (listed below)  to help with BP management Yes  Lifestyle changes that can help control high blood pressure:  Even though PGEN is a study to test effectiveness of genetically guided medications for managing high blood pressure, there are several things you can do to ensure your blood pressure stays in good control:    Maintain a healthy weight (BMI<26). A modest amount of weight loss can be helpful    Limit salt intake to under 2400mg daily    Follow the DASH diet (lean meats, low salt, whole grains, lots of fruits/vegies)    Stay active, try to get in 30 minutes of exercise daily.    Manage your daily stress.    Do not smoke cigarettes (or cut back)    Limit alcohol (2 drinks/day for men, 1 drink/day for women)  Was AVS  provided to patient with the relevant <dot>PGENPI dot  phrase pulled into patient instructions Yes    Luly Hutchins NP

## 2017-11-08 NOTE — NURSING NOTE
"Chief Complaint   Patient presents with     Hypertension       Initial BP (!) 131/94 (BP Location: Right arm, Patient Position: Chair, Cuff Size: Adult Large)  Pulse 77  Temp 98  F (36.7  C) (Oral)  Resp 16  Ht 5' 4.5\" (1.638 m)  Wt 196 lb (88.9 kg)  SpO2 97%  BMI 33.12 kg/m2 Estimated body mass index is 33.12 kg/(m^2) as calculated from the following:    Height as of this encounter: 5' 4.5\" (1.638 m).    Weight as of this encounter: 196 lb (88.9 kg).  Medication Reconciliation: complete   Paulette Meek CMA (AAMA)      "

## 2017-12-07 ENCOUNTER — ALLIED HEALTH/NURSE VISIT (OUTPATIENT)
Dept: FAMILY MEDICINE | Facility: CLINIC | Age: 47
End: 2017-12-07

## 2017-12-07 VITALS
HEART RATE: 85 BPM | OXYGEN SATURATION: 98 % | WEIGHT: 195 LBS | DIASTOLIC BLOOD PRESSURE: 89 MMHG | TEMPERATURE: 97.6 F | SYSTOLIC BLOOD PRESSURE: 126 MMHG | HEIGHT: 65 IN | BODY MASS INDEX: 32.49 KG/M2

## 2017-12-07 DIAGNOSIS — I10 ESSENTIAL HYPERTENSION: Primary | ICD-10-CM

## 2017-12-07 PROCEDURE — 99213 OFFICE O/P EST LOW 20 MIN: CPT | Performed by: NURSE PRACTITIONER

## 2017-12-07 NOTE — MR AVS SNAPSHOT
After Visit Summary   12/7/2017    Evans Salinas    MRN: [id number]           Patient Information     Date Of Birth          1970        Visit Information        Provider Department      12/7/2017 9:00 AM Luly Hutchins APRN CNP [facility]        Today's Diagnoses     Essential hypertension    -  1      Care Instructions    PGen Hypertension Study   Visit 4     Thank you for your continued participation in the hypertension study!  Please continue taking your hypertension medications as directed. Your next visit will be in one and a half months and will be scheduled for you in the coming days.     After it is scheduled, be sure to let the research coordinator know as soon as possible if you cannot make it so that the visit can be rescheduled for you.     Please contact the research coordinator if you receive care for your hypertension outside of your study visits.    If you have any questions, please contact the research coordinator at [phone]. If you experience any symptoms please call the [facility] 24/7 triage number at [phone]. If your phone number, email or address changes please alert the research coordinator.     In the meantime, we ask that you complete the online surveys emailed out to you, if completing online, or mailed out to you, if completing paper surveys, before your next visit.          Follow-ups after your visit        Your next 10 appointments already scheduled     Jan 17, 2018  8:45 AM CST   Nurse Only with Ortega Mooney MD   [facility])    [address] [address]   [phone]            Feb 28, 2018  8:45 AM CST   Nurse Only with Ortega Mooney MD   [facility]    [address] [address]   [phone]            May 23, 2018  8:45 AM CDT   Nurse Only with Ortega Mooney MD  "  [facility] ([facility]    [address]  [facility] [address]   [phone]            Sep 12, 2018  8:45 AM CDT   Nurse Only with Ortega Mooney MD   [facility] ([facility])    [address]  [facility] [address]   [phone]              Who to contact     If you have questions or need follow up information about today's clinic visit or your schedule please contact [facility] directly at [phone].  Normal or non-critical lab and imaging results will be communicated to you by Pongrhart, letter or phone within 4 business days after the clinic has received the results. If you do not hear from us within 7 days, please contact the clinic through Pongrhart or phone. If you have a critical or abnormal lab result, we will notify you by phone as soon as possible.  Submit refill requests through Mojostreet or call your pharmacy and they will forward the refill request to us. Please allow 3 business days for your refill to be completed.          Additional Information About Your Visit        PongrharMoonClerk Information     Mojostreet gives you secure access to your electronic health record. If you see a primary care provider, you can also send messages to your care team and make appointments. If you have questions, please call your primary care clinic.  If you do not have a primary care provider, please call [phone] and they will assist you.        Care EveryWhere ID     This is your Care EveryWhere ID. This could be used by other organizations to access your [facility] medical records  [id number]        Your Vitals Were     Pulse Temperature Height Pulse Oximetry Breastfeeding? BMI (Body Mass Index)    85 97.6  F (36.4  C) (Oral) 5' 4.5\" (1.638 m) 98% No 32.95 kg/m2       Blood Pressure from Last 3 Encounters:   12/07/17 126/89   11/08/17 132/88   10/11/17 108/74    Weight from Last 3 Encounters:   12/07/17 195 lb (88.5 kg)   11/08/17 " 196 lb (88.9 kg)   10/11/17 193 lb 8 oz (87.8 kg)              Today, you had the following     No orders found for display       Primary Care Provider Office Phone # Fax #    Astrid [facility] [phone] [address] [place] [address]        Equal Access to Services     ARACELI OKEEFE : Hadii aad ku hadasho Soomaali, waaxda luqadaha, qaybta kaalmada adeegyada, waxay idiin hayaan adeeg khdahliagil laisacn . So [facility] [phone].    ATENCIÓN: Si habla español, tiene a goldstein disposición servicios gratuitos de asistencia lingüística. Llame al [phone].    We comply with applicable federal civil rights laws and [place] laws. We do not discriminate on the basis of race, color, national origin, age, disability, sex, sexual orientation, or gender identity.            Thank you!     Thank you for choosing [facility]  for your care. Our goal is always to provide you with excellent care. Hearing back from our patients is one way we can continue to improve our services. Please take a few minutes to complete the written survey that you may receive in the mail after your visit with us. Thank you!             Your Updated Medication List - Protect others around you: Learn how to safely use, store and throw away your medicines at www.disposemymeds.org.          This list is accurate as of: 12/7/17  9:28 AM.  Always use your most recent med list.                   Brand Name Dispense Instructions for use Diagnosis    ABILIFY 15 MG tablet   Generic drug:  ARIPiprazole     30 tablet    Take 1 tablet (15 mg) by mouth At Bedtime        lisinopril 10 MG tablet    PRINIVIL/ZESTRIL    30 tablet    Take 1 tablet (10 mg) by mouth daily    Essential hypertension       venlafaxine 150 MG 24 hr capsule    EFFEXOR-XR    90 capsule    Take 1 capsule (150 mg) by mouth daily

## 2017-12-07 NOTE — PROGRESS NOTES
"PGEN study visit  NEW HYPERTENSION diagnosis visit 4    SUBJECTIVE:  Evans is here for 4th PGEN research study visit. Please refer to research tab in the header and today's flow sheet for further details. Patient had new onset hypertension at enrollment and has a goal of <  140/90 (per Problem list target chosen by PCP)     Prior research note reviewed. Patient is on blood pressure medication(s) at this time.  she has been taking Lisinopril and is tolerating the medication well.      Current diet & lifestyle: some walking, mindful of salt   Smoking: none  Alcohol consumption none  Other pertinent history none     BP Readings from Last 3 Encounters:   12/07/17 126/89   11/08/17 132/88   10/11/17 108/74       Current Outpatient Prescriptions   Medication Sig Dispense Refill     lisinopril (PRINIVIL/ZESTRIL) 10 MG tablet Take 1 tablet (10 mg) by mouth daily 30 tablet 1     venlafaxine (EFFEXOR-XR) 150 MG 24 hr capsule Take 1 capsule (150 mg) by mouth daily 90 capsule 1     ARIPiprazole (ABILIFY) 15 MG tablet Take 1 tablet (15 mg) by mouth At Bedtime 30 tablet 1     Potassium   Date Value Ref Range Status   11/08/2017 4.4 3.4 - 5.3 mmol/L Final     Creatinine   Date Value Ref Range Status   11/08/2017 0.84 0.52 - 1.04 mg/dL Final     Urea Nitrogen   Date Value Ref Range Status   11/08/2017 9 7 - 30 mg/dL Final     GFR Estimate   Date Value Ref Range Status   11/08/2017 73 >60 mL/min/1.7m2 Final     Comment:     Non  GFR Calc      A baseline potassium, creatinine, BUN, GFR has been done within past 12 months      OBJECTIVE:  Patient in in no apparent distress and able to provide full history for today's encounter. she denies pain or any current illness   Vitals: /89 (BP Location: Right arm, Cuff Size: Adult Large)  Pulse 85  Temp 97.6  F (36.4  C) (Oral)  Ht 5' 4.5\" (1.638 m)  Wt 195 lb (88.5 kg)  SpO2 98%  Breastfeeding? No  BMI 32.95 kg/m2  Today's BP completed using cuff size: large on " right side arm.      Pulse Readings from Last 1 Encounters:   12/07/17 85     Is pulse 55 or greater? - Yes    BMI= Body mass index is 32.95 kg/(m^2).  See PGEN flow sheet for other exam findings.       ASSESSMENT/PLAN:   Blood pressure reading today is at the provider specified goal of <140/90.      PGEN Flowsheet has been  'filed' ) for this visit (this saves flowsheet data)      1.  Based on PGEN RN HTN MGMT standing order the patient will be advised continue current medication regimen unchanged.     2.  We will not be checking a metabolic lab panel today.      3.  Follow up instructions include:     Next Provider visit: Follow up in 1 month..    See avs for more details.  Full research packet also provided to patient previously  Our research team will reach out to patient to schedule follow up visit as well.     Patient was counseled regarding the lifestyle changes (listed below)  to help with BP management Yes  Lifestyle changes that can help control high blood pressure:  Even though PGEN is a study to test effectiveness of genetically guided medications for managing high blood pressure, there are several things you can do to ensure your blood pressure stays in good control:    Maintain a healthy weight (BMI<26). A modest amount of weight loss can be helpful    Limit salt intake to under 2400mg daily    Follow the DASH diet (lean meats, low salt, whole grains, lots of fruits/vegies)    Stay active, try to get in 30 minutes of exercise daily.    Manage your daily stress.    Do not smoke cigarettes (or cut back)    Limit alcohol (2 drinks/day for men, 1 drink/day for women)  Was AVS  provided to patient with the relevant <dot>PGENPI dot phrase pulled into patient instructions Yes    Luly Hutchins NP

## 2017-12-07 NOTE — PATIENT INSTRUCTIONS
[facility] Hypertension Study   Visit 4     Thank you for your continued participation in the hypertension study!  Please continue taking your hypertension medications as directed. Your next visit will be in one and a half months and will be scheduled for you in the coming days.     After it is scheduled, be sure to let the research coordinator know as soon as possible if you cannot make it so that the visit can be rescheduled for you.     Please contact the research coordinator if you receive care for your hypertension outside of your study visits.    If you have any questions, please contact the research coordinator at [phone]. If you experience any symptoms please call the [facility] 24/7 triage number at [phone]. If your phone number, email or address changes please alert the research coordinator.     In the meantime, we ask that you complete the online surveys emailed out to you, if completing online, or mailed out to you, if completing paper surveys, before your next visit.

## 2017-12-07 NOTE — PROGRESS NOTES
"HPI      SUBJECTIVE:   Evans Salinas is a 47 year old female who presents to clinic today for the following health issues:      Chief Complaint   Patient presents with     Research Study     P Gen Study         {additional problems for provider to add:073789}    Problem list and histories reviewed & adjusted, as indicated.  Additional history: {NONE - AS DOCUMENTED:192998::\"as documented\"}    {HIST REVIEW/ LINKS 2:032669}    Reviewed and updated as needed this visit by clinical staff  Tobacco  Allergies  Soc Hx      Reviewed and updated as needed this visit by Provider         {PROVIDER CHARTING PREFERENCE:646159}    ROS      Physical Exam      "

## 2017-12-07 NOTE — NURSING NOTE
"Chief Complaint   Patient presents with     Research Study     P Gen Study       Initial /89 (BP Location: Right arm, Cuff Size: Adult Large)  Pulse 85  Temp 97.6  F (36.4  C) (Oral)  Ht 5' 4.5\" (1.638 m)  Wt 195 lb (88.5 kg)  SpO2 98%  Breastfeeding? No  BMI 32.95 kg/m2 Estimated body mass index is 32.95 kg/(m^2) as calculated from the following:    Height as of this encounter: 5' 4.5\" (1.638 m).    Weight as of this encounter: 195 lb (88.5 kg).  Medication Reconciliation: complete       Antoinette Lyon CMA      "

## 2017-12-22 ENCOUNTER — TELEPHONE (OUTPATIENT)
Dept: FAMILY MEDICINE | Facility: CLINIC | Age: 47
End: 2017-12-22

## 2017-12-22 DIAGNOSIS — I10 ESSENTIAL HYPERTENSION: ICD-10-CM

## 2017-12-22 RX ORDER — LISINOPRIL 10 MG/1
10 TABLET ORAL DAILY
Qty: 90 TABLET | Refills: 1 | Status: SHIPPED | OUTPATIENT
Start: 2017-12-22 | End: 2018-02-28 | Stop reason: DRUGHIGH

## 2017-12-22 NOTE — TELEPHONE ENCOUNTER
I refilled her lisinopril 10mg.  Advise her to take this and not the prior medication.    For future reference we will also need her preferred pharmacy .  I sent it to [facility] where the prior prescription wend also.  She should stay in the study and have follow-up per protocol.   Aimee Johnson MD

## 2017-12-22 NOTE — TELEPHONE ENCOUNTER
Patient contacted regarding appropriate medication use. She confirmed that [facility] is her preferred pharmacy. She has been advised to continue attending study visits and completing associated surveys.

## 2017-12-22 NOTE — TELEPHONE ENCOUNTER
"Patient called today wondering about a refill for her medication. She states that she ran out of lisinopril \"three or four days ago\" and in the mean time she started taking some old pills that she had been prescribed previously that was a combination pill with both lisinopril and \"one that makes you pee a lot\". She was unsure what the medication was exactly or the dose. Can you please advise if it is appropriate for this patient to continue on the PGen study and what should be done regarding her refill status.   "

## 2018-01-17 ENCOUNTER — ALLIED HEALTH/NURSE VISIT (OUTPATIENT)
Dept: FAMILY MEDICINE | Facility: CLINIC | Age: 48
End: 2018-01-17
Payer: COMMERCIAL

## 2018-01-17 VITALS
SYSTOLIC BLOOD PRESSURE: 146 MMHG | BODY MASS INDEX: 33.82 KG/M2 | OXYGEN SATURATION: 99 % | DIASTOLIC BLOOD PRESSURE: 98 MMHG | HEIGHT: 65 IN | WEIGHT: 203 LBS | HEART RATE: 92 BPM | TEMPERATURE: 97.4 F | RESPIRATION RATE: 18 BRPM

## 2018-01-17 DIAGNOSIS — Z23 NEED FOR PROPHYLACTIC VACCINATION AND INOCULATION AGAINST INFLUENZA: ICD-10-CM

## 2018-01-17 PROCEDURE — 90471 IMMUNIZATION ADMIN: CPT | Performed by: INTERNAL MEDICINE

## 2018-01-17 PROCEDURE — 99213 OFFICE O/P EST LOW 20 MIN: CPT | Performed by: INTERNAL MEDICINE

## 2018-01-17 PROCEDURE — 90686 IIV4 VACC NO PRSV 0.5 ML IM: CPT | Performed by: INTERNAL MEDICINE

## 2018-01-17 RX ORDER — BUPROPION HYDROCHLORIDE 300 MG/1
TABLET ORAL
COMMUNITY
Start: 2018-01-10

## 2018-01-17 NOTE — MR AVS SNAPSHOT
After Visit Summary   1/17/2018    Evans Salinas    MRN: [id number]           Patient Information     Date Of Birth          1970        Visit Information        Provider Department      1/17/2018 8:45 AM Ortega Mooney MD [facility]        Today's Diagnoses     Need for prophylactic vaccination and inoculation against influenza          Care Instructions    Increase your Lisinopril to 2 tablets once a day.  Call doctor if you develop any side effects from the medication.      PGen Hypertension Study  Visit 5    Thank you for your continued participation in the hypertension study!  Please continue taking your hypertension medications as directed. Your next visit will be in one and a half months and will be scheduled for you in the coming days. After it is scheduled, be sure to let the research coordinator know as soon as possible if you cannot make it so that the visit can be rescheduled for you.     Please contact the research coordinator if you receive care for your hypertension outside of your study visits.    If you have any questions, please contact the research coordinator at [phone]. If you experience any symptoms please call the [facility] 24/7 triage number at [phone]. If your phone number, email or address changes please alert the research coordinator.     In the meantime, we ask that you complete the online surveys emailed out to you, if completing online, or mailed out to you, if completing paper surveys, before your next visit.          Follow-ups after your visit        Your next 10 appointments already scheduled     Feb 28, 2018  8:45 AM CST   Nurse Only with Ortega Mooney MD   [facility] ([facility])    [address] [facility] [address]   [phone]            May 23, 2018  8:45 AM CDT   Nurse Only with Ortega Mooney MD   [facility] ([facility]  "[place])    [address] [place] [id number] [phone]            Sep 12, 2018  8:45 AM CDT   Nurse Only with Ortega Mooney MD   [facility] ([facility])    [address]  [facility] [id number] [phone]              Who to contact     If you have questions or need follow up information about today's clinic visit or your schedule please contact [facility] directly at [phone].  Normal or non-critical lab and imaging results will be communicated to you by Truly Wirelesshart, letter or phone within 4 business days after the clinic has received the results. If you do not hear from us within 7 days, please contact the clinic through Space Pencilt or phone. If you have a critical or abnormal lab result, we will notify you by phone as soon as possible.  Submit refill requests through PhytoCeutica or call your pharmacy and they will forward the refill request to us. Please allow 3 business days for your refill to be completed.          Additional Information About Your Visit        MyChart Information     PhytoCeutica gives you secure access to your electronic health record. If you see a primary care provider, you can also send messages to your care team and make appointments. If you have questions, please call your primary care clinic.  If you do not have a primary care provider, please call [phone] and they will assist you.        Care EveryWhere ID     This is your Care EveryWhere ID. This could be used by other organizations to access your [facility] medical records  [id number]        Your Vitals Were     Pulse Temperature Respirations Height Pulse Oximetry BMI (Body Mass Index)    92 97.4  F (36.3  C) (Oral) 18 5' 4.5\" (1.638 m) 99% 34.31 kg/m2       Blood Pressure from Last 3 Encounters:   01/17/18 (!) 146/98   12/07/17 126/89   11/08/17 132/88    Weight from Last 3 Encounters:   01/17/18 203 lb (92.1 kg)   12/07/17 195 lb (88.5 kg)   11/08/17 196 lb (88.9 " kg)              We Performed the Following     FLU VAC, SPLIT VIRUS IM > 3 YO (QUADRIVALENT) [24460]     Vaccine Administration, Initial [60973]        Primary Care Provider Office Phone # Fax #    Astrid [facility] [phone] [address] [place] [address]        Equal Access to Services     ARACELI OKEEFE : Hadii aad ku hadasho Soomaali, waaxda luqadaha, qaybta kaalmada adeegyada, waxay teresoin hayaan adeeg khdahliagil laisacn . So [facility] [phone].    ATENCIÓN: Si habla español, tiene a goldstein disposición servicios gratuitos de asistencia lingüística. Llame al [phone].    We comply with applicable federal civil rights laws and [place] laws. We do not discriminate on the basis of race, color, national origin, age, disability, sex, sexual orientation, or gender identity.            Thank you!     Thank you for choosing [facility]  for your care. Our goal is always to provide you with excellent care. Hearing back from our patients is one way we can continue to improve our services. Please take a few minutes to complete the written survey that you may receive in the mail after your visit with us. Thank you!             Your Updated Medication List - Protect others around you: Learn how to safely use, store and throw away your medicines at www.disposemymeds.org.          This list is accurate as of: 1/17/18  9:21 AM.  Always use your most recent med list.                   Brand Name Dispense Instructions for use Diagnosis    ABILIFY 15 MG tablet   Generic drug:  ARIPiprazole     30 tablet    Take 1 tablet (15 mg) by mouth At Bedtime        buPROPion 300 MG 24 hr tablet    WELLBUTRIN XL          lisinopril 10 MG tablet    PRINIVIL/ZESTRIL    90 tablet    Take 1 tablet (10 mg) by mouth daily    Essential hypertension       venlafaxine 150 MG 24 hr capsule    EFFEXOR-XR    90 capsule    Take 1 capsule (150 mg) by mouth daily

## 2018-01-17 NOTE — PROGRESS NOTES
"PGEN study visit  NEW HYPERTENSION diagnosis visit 5    SUBJECTIVE:  Evans is here for 5th PGEN research study visit. Please refer to research tab in the header and today's flow sheet for further details. Patient had new onset hypertension at enrollment and has a goal of < 140/90 (per Problem list target chosen by PCP)     Prior research note reviewed. Patient is on blood pressure medication at this time.  She has been taking Lisinopril and is tolerating the medication well.       Current diet & lifestyle: some walking, mindful of salt   Smoking: none  Alcohol consumption none  Other pertinent history none       BP Readings from Last 3 Encounters:   01/17/18 (!) 146/98   12/07/17 126/89   11/08/17 132/88       Current Outpatient Prescriptions   Medication Sig Dispense Refill     buPROPion (WELLBUTRIN XL) 300 MG 24 hr tablet        lisinopril (PRINIVIL/ZESTRIL) 10 MG tablet Take 1 tablet (10 mg) by mouth daily 90 tablet 1     venlafaxine (EFFEXOR-XR) 150 MG 24 hr capsule Take 1 capsule (150 mg) by mouth daily 90 capsule 1     ARIPiprazole (ABILIFY) 15 MG tablet Take 1 tablet (15 mg) by mouth At Bedtime 30 tablet 1     Potassium   Date Value Ref Range Status   11/08/2017 4.4 3.4 - 5.3 mmol/L Final     Creatinine   Date Value Ref Range Status   11/08/2017 0.84 0.52 - 1.04 mg/dL Final     Urea Nitrogen   Date Value Ref Range Status   11/08/2017 9 7 - 30 mg/dL Final     GFR Estimate   Date Value Ref Range Status   11/08/2017 73 >60 mL/min/1.7m2 Final     Comment:     Non  GFR Calc      A baseline potassium, creatinine, BUN, GFR has been done within past 12 months      OBJECTIVE:  Patient in in no apparent distress and able to provide full history for today's encounter. she denies pain or any current illness   Vitals: BP (!) 146/98 (BP Location: Left arm, Patient Position: Chair, Cuff Size: Adult Regular)  Pulse 92  Temp 97.4  F (36.3  C) (Oral)  Resp 18  Ht 5' 4.5\" (1.638 m)  Wt 203 lb (92.1 kg)  " SpO2 99%  BMI 34.31 kg/m2  Today's BP completed using cuff size: regular on left side  arm.      Pulse Readings from Last 1 Encounters:   01/17/18 92     Is pulse 55 or greater? - Yes    BMI= Body mass index is 34.31 kg/(m^2).  See [facility] flow sheet for other exam findings.      Physical Exam   Constitutional: She is oriented to person, place, and time. No distress.   Cardiovascular: Normal rate, regular rhythm and normal heart sounds.    Pulmonary/Chest: Effort normal and breath sounds normal. No respiratory distress.   Musculoskeletal: She exhibits no edema.   Neurological: She is alert and oriented to person, place, and time. Coordination normal.   Nursing note and vitals reviewed.      ASSESSMENT/PLAN:   Blood pressure reading today is not at the provider specified goal of <140/90.      [facility]  [facility] ) for this [facility] (this saves flowsheet data)      1.  Based on PGEN RN HTN MGMT standing order the patient will be advised dosage change: increase Lisinopril to 20mg once a day.     2.  We will not be checking a metabolic lab panel today.      3.  Follow up instructions include:     Next Provider visit: Follow up in 6 weeks..    See avs for more details.  Full research packet also provided to patient previously  Our research team will reach out to patient to schedule follow up visit as well.     Patient was counseled regarding the lifestyle changes (listed below)  to help with BP management Yes  Lifestyle changes that can help control high blood pressure:  Even though [facility] is [facility] to test effectiveness of genetically guided medications for managing high blood pressure, there are several things you can do to ensure your blood pressure stays in good control:    Maintain a healthy weight (BMI<26). A modest amount of weight loss can be helpful    Limit salt intake to under 2400mg daily    Follow the DASH diet (lean meats, low salt, whole grains, lots of fruits/vegies)    Stay active, try to get in  30 minutes of exercise daily.    Manage your daily stress.    Do not smoke cigarettes (or cut back)    Limit alcohol (2 drinks/day for men, 1 drink/day for women)  Was AVS  provided to patient with the relevant <dot>PGENPI dot phrase pulled into patient instructions Yes    Ortega Mooney MD

## 2018-01-17 NOTE — PATIENT INSTRUCTIONS
Increase your Lisinopril to 2 tablets once a day.  Call doctor if you develop any side effects from the medication.      [facility] Hypertension Study  Visit 5    Thank you for your continued participation in the hypertension study!  Please continue taking your hypertension medications as directed. Your next visit will be in one and a half months and will be scheduled for you in the coming days. After it is scheduled, be sure to let the research coordinator know as soon as possible if you cannot make it so that the visit can be rescheduled for you.     Please contact the research coordinator if you receive care for your hypertension outside of your study visits.    If you have any questions, please contact the research coordinator at [phone]. If you experience any symptoms please call the [employer] 24/7 triage number at [phone]. If your phone number, email or address changes please alert the research coordinator.     In the meantime, we ask that you complete the online surveys emailed out to you, if completing online, or mailed out to you, if completing paper surveys, before your next visit.

## 2018-01-17 NOTE — NURSING NOTE
"Chief Complaint   Patient presents with     Hypertension     Visit #5       Initial /88 (BP Location: Left arm, Patient Position: Chair, Cuff Size: Adult Regular)  Pulse 92  Temp 97.4  F (36.3  C) (Oral)  Resp 18  Ht 5' 4.5\" (1.638 m)  Wt 203 lb (92.1 kg)  SpO2 99%  BMI 34.31 kg/m2 Estimated body mass index is 34.31 kg/(m^2) as calculated from the following:    Height as of this encounter: 5' 4.5\" (1.638 m).    Weight as of this encounter: 203 lb (92.1 kg).  Medication Reconciliation: complete   Pauletet Meek CMA (AAMA)      "

## 2018-01-17 NOTE — PROGRESS NOTES

## 2018-02-22 ENCOUNTER — TELEPHONE (OUTPATIENT)
Dept: FAMILY MEDICINE | Facility: CLINIC | Age: 48
End: 2018-02-22

## 2018-02-22 DIAGNOSIS — I10 ESSENTIAL HYPERTENSION: Primary | ICD-10-CM

## 2018-02-22 RX ORDER — LISINOPRIL 20 MG/1
20 TABLET ORAL DAILY
Qty: 30 TABLET | Refills: 3 | Status: SHIPPED | OUTPATIENT
Start: 2018-02-22 | End: 2018-05-23

## 2018-02-22 NOTE — TELEPHONE ENCOUNTER
1-17-18 PGen visit notes by Dr. Mooney = 1.  Based on PGEN RN HTN MGMT standing order the patient will be advised dosage change: increase Lisinopril to 20mg once a day.       I pended 20 mg Lisinopril once daily if want that sent in vs. Change sig on 10 mg to 2 tabs daily

## 2018-02-22 NOTE — TELEPHONE ENCOUNTER
Reason for Call:  Other prescription    Detailed comments: Pt called wondering if something can be done since she was trying to refill her lisinopril (PRINIVIL/ZESTRIL) 10 MG tablet and the pharmacy told her insurance won't pay for this until 2/27. Pt said that in her last visit the doctor told her to take more than one pill per day and that is why she ran out of pills...  Please call pt for advise, she needs the medication asap  It can be send to [id number] - [place], [place] - [address]    Phone Number Patient can be reached at: Cell number on file:    Telephone Information:   Mobile [phone]       Best Time: any time    Can we leave a detailed message on this number? YES    Call taken on 2/22/2018 at 4:06 PM by Cindy Markham

## 2018-02-28 ENCOUNTER — ALLIED HEALTH/NURSE VISIT (OUTPATIENT)
Dept: FAMILY MEDICINE | Facility: CLINIC | Age: 48
End: 2018-02-28
Payer: COMMERCIAL

## 2018-02-28 VITALS
TEMPERATURE: 98.4 F | OXYGEN SATURATION: 100 % | HEART RATE: 74 BPM | WEIGHT: 200.8 LBS | DIASTOLIC BLOOD PRESSURE: 88 MMHG | SYSTOLIC BLOOD PRESSURE: 128 MMHG | HEIGHT: 65 IN | BODY MASS INDEX: 33.45 KG/M2

## 2018-02-28 DIAGNOSIS — I10 ESSENTIAL HYPERTENSION: Primary | ICD-10-CM

## 2018-02-28 PROCEDURE — 99213 OFFICE O/P EST LOW 20 MIN: CPT | Performed by: INTERNAL MEDICINE

## 2018-02-28 NOTE — PROGRESS NOTES
"PGEN study visit  NEW HYPERTENSION diagnosis visit 6    SUBJECTIVE:  Evans is here for 6th PGEN research study visit. Please refer to research tab in the header and today's flow sheet for further details. Patient had new onset hypertension at enrollment and has a goal of < 140/90 (per Problem list target chosen by PCP)     Prior research note reviewed. Patient is on blood pressure medication at this time.  She has been taking Lisinopril 20 mg (increased during previous clinic visit) daily and is tolerating the medication well.        Current diet & lifestyle: some walking, mindful of salt   Smoking: none  Alcohol consumption: none  Other pertinent history: none     BP Readings from Last 3 Encounters:   02/28/18 128/88   01/17/18 (!) 146/98   12/07/17 126/89       Current Outpatient Prescriptions   Medication Sig Dispense Refill     lisinopril (PRINIVIL/ZESTRIL) 20 MG tablet Take 1 tablet (20 mg) by mouth daily 30 tablet 3     buPROPion (WELLBUTRIN XL) 300 MG 24 hr tablet        ARIPiprazole (ABILIFY) 15 MG tablet Take 1 tablet (15 mg) by mouth At Bedtime 30 tablet 1     [DISCONTINUED] lisinopril (PRINIVIL/ZESTRIL) 10 MG tablet Take 1 tablet (10 mg) by mouth daily 90 tablet 1     Potassium   Date Value Ref Range Status   11/08/2017 4.4 3.4 - 5.3 mmol/L Final     Creatinine   Date Value Ref Range Status   11/08/2017 0.84 0.52 - 1.04 mg/dL Final     Urea Nitrogen   Date Value Ref Range Status   11/08/2017 9 7 - 30 mg/dL Final     GFR Estimate   Date Value Ref Range Status   11/08/2017 73 >60 mL/min/1.7m2 Final     Comment:     Non  GFR Calc      A baseline potassium, creatinine, BUN, GFR has been done within past 12 months      OBJECTIVE:  Patient in in no apparent distress and able to provide full history for today's encounter. she denies pain or any current illness   Vitals: /88  Pulse 74  Temp 98.4  F (36.9  C) (Oral)  Ht 5' 4.5\" (1.638 m)  Wt 200 lb 12.8 oz (91.1 kg)  SpO2 100%  BMI " 33.93 kg/m2  Today's BP completed using cuff size: regular on left side arm.      Pulse Readings from Last 1 Encounters:   02/28/18 74     Is pulse 55 or greater? - Yes    BMI= Body mass index is 33.93 kg/(m^2).  See PGEN flow sheet for other exam findings.       ASSESSMENT/PLAN:   Blood pressure reading today is at the provider specified goal of <140/90.      PGEN Flowsheet has been  'filed' ) for this visit (this saves flowsheet data)      1.  Based on PGEN RN HTN MGMT standing order the patient will be advised continue current medication regimen unchanged.     2.  We will not be checking a metabolic lab panel today.      3.  Follow up instructions include:     Next Provider visit: Follow up in 3 months..    See avs for more details.  Full research packet also provided to patient previously  Our research team will reach out to patient to schedule follow up visit as well.     Patient was counseled regarding the lifestyle changes (listed below) to help with BP management Yes  Lifestyle changes that can help control high blood pressure:  [facility] though [facility] to test effectiveness of genetically guided medications for managing high blood pressure, there are several things you can do to ensure your blood pressure stays in good control:    Maintain a healthy weight (BMI<26). A modest amount of weight loss can be helpful    Limit salt intake to under 2400mg daily    Follow the DASH diet (lean meats, low salt, whole grains, lots of fruits/vegies)    Stay active, try to get in 30 minutes of exercise daily.    Manage your daily stress.    Do not smoke cigarettes (or cut back)    Limit alcohol (2 drinks/day for men, 1 drink/day for women)  Was AVS  provided to patient with the relevant <dot>PGENPI dot phrase pulled into patient instructions Yes    Ortega Mooney MD

## 2018-02-28 NOTE — MR AVS SNAPSHOT
After Visit Summary   2/28/2018    Evans Salinas    MRN: [id number]           Patient Information     Date Of Birth          1970        Visit Information        Provider Department      2/28/2018 8:45 AM Ortega Mooney MD [facility]        Care Instructions    PGen Hypertension Study  Visit 6     Thank you for your continued participation in the hypertension study!  Please continue taking your hypertension medications as directed. Your next visit will be in three months and will be scheduled for you in the coming days. After it is scheduled, be sure to let the research coordinator know as soon as possible if you cannot make it so that the visit can be rescheduled for you.     Please contact the research coordinator if you receive care for your hypertension outside of your study visits.    If you have any questions, please contact the research coordinator at [phone]. If you experience any symptoms please call the [facility] 24/7 triage number at [phone]. If your phone number, email or address changes please alert the research coordinator.     In the meantime, we ask that you complete the online surveys emailed out to you, if completing online, or mailed out to you, if completing paper surveys, before your next visit.          Follow-ups after your visit        Your next 10 appointments already scheduled     May 23, 2018  8:45 AM CDT   Nurse Only with Ortega Mooney MD   [facility]    [address] [address]   [phone]            Sep 12, 2018  8:45 AM CDT   Nurse Only with Ortega Mooney MD   [facility]    [address] [address]   [phone]              Who to contact     If you have questions or need follow up information about today's clinic visit or your schedule please contact [facility]  "SHYLA directly at [phone].  Normal or non-critical lab and imaging results will be communicated to you by MyChart, letter or phone within 4 business days after the clinic has received the results. If you do not hear from us within 7 days, please contact the clinic through [employer] or phone. If you have a critical or abnormal lab result, we will notify you by phone as soon as possible.  Submit refill requests through qcue or call your pharmacy and they will forward the refill request to us. Please allow 3 business days for your refill to be completed.          Additional Information About Your Visit        qcue Information     qcue gives you secure access to your electronic health record. If you see a primary care provider, you can also send messages to your care team and make appointments. If you have questions, please call your primary care clinic.  If you do not have a primary care provider, please call [phone] and they will assist you.        Care EveryWhere ID     This is your Care EveryWhere ID. This could be used by other organizations to access your [place] medical records  [id number]        Your Vitals Were     Pulse Temperature Height Pulse Oximetry BMI (Body Mass Index)       74 98.4  F (36.9  C) (Oral) 5' 4.5\" (1.638 m) 100% 33.93 kg/m2        Blood Pressure from Last 3 Encounters:   02/28/18 128/88   01/17/18 (!) 146/98   12/07/17 126/89    Weight from Last 3 Encounters:   02/28/18 200 lb 12.8 oz (91.1 kg)   01/17/18 203 lb (92.1 kg)   12/07/17 195 lb (88.5 kg)              Today, you had the following     No orders found for display         Today's Medication Changes          These changes are accurate as of 2/28/18  9:24 AM.  If you have any questions, ask your nurse or doctor.               These medicines have changed or have updated prescriptions.        Dose/Directions    lisinopril 20 MG tablet   Commonly known as:  PRINIVIL/ZESTRIL   This may have changed:  Another medication " with the same name was removed. Continue taking this medication, and follow the directions you see here.   Used for:  Essential hypertension   Changed by:  Ortega Mooney MD        Dose:  20 mg   Take 1 tablet (20 mg) by mouth daily   Quantity:  30 tablet   Refills:  3         Stop taking these medicines if you haven't already. Please contact your care team if you have questions.     venlafaxine 150 MG 24 hr capsule   Commonly known as:  EFFEXOR-XR   Stopped by:  Ortega Mooney MD                    Primary Care Provider Office Phone # Fax #    [facility] [phone] [address] [place] [facility] [address]        Equal Access to Services     [facility]: Hadii brandon miranda hadasho Sochristiano, waaxda luqadaha, qaybta kaalmada adeegyada, samuel cox . So [facility] [phone].    ATENCIÓN: Si habla español, tiene a goldstein disposición servicios gratuitos de asistencia lingüística. [facility] [phone].    We comply with applicable federal civil rights laws and [place] laws. We do not discriminate on the basis of race, color, national origin, age, disability, sex, sexual orientation, or gender identity.            Thank you!     Thank you for choosing [facility]  for your care. Our goal is always to provide you with excellent care. Hearing back from our patients is one way we can continue to improve our services. Please take a few minutes to complete the written survey that you may receive in the mail after your visit with us. Thank you!             Your Updated Medication List - Protect others around you: Learn how to safely use, store and throw away your medicines at www.disposemymeds.org.          This list is accurate as of 2/28/18  9:24 AM.  Always use your most recent med list.                   Brand Name Dispense Instructions for use Diagnosis    ABILIFY 15 MG tablet   Generic drug:  ARIPiprazole     30 tablet     Take 1 tablet (15 mg) by mouth At Bedtime        buPROPion 300 MG 24 hr tablet    WELLBUTRIN XL          lisinopril 20 MG tablet    PRINIVIL/ZESTRIL    30 tablet    Take 1 tablet (20 mg) by mouth daily    Essential hypertension

## 2018-02-28 NOTE — NURSING NOTE
"Chief Complaint   Patient presents with     Follow Up For     PGEN study       Initial /87 (BP Location: Left arm, Cuff Size: Adult Large)  Pulse 82  Temp 98.4  F (36.9  C) (Oral)  Ht 5' 4.5\" (1.638 m)  Wt 200 lb 12.8 oz (91.1 kg)  SpO2 100%  BMI 33.93 kg/m2 Estimated body mass index is 33.93 kg/(m^2) as calculated from the following:    Height as of this encounter: 5' 4.5\" (1.638 m).    Weight as of this encounter: 200 lb 12.8 oz (91.1 kg).  Medication Reconciliation: complete   Lu Evans MA  "

## 2018-02-28 NOTE — PATIENT INSTRUCTIONS
[facility] Hypertension Study  Visit 6     Thank you for your continued participation in the hypertension study!  Please continue taking your hypertension medications as directed. Your next visit will be in three months and will be scheduled for you in the coming days. After it is scheduled, be sure to let the research coordinator know as soon as possible if you cannot make it so that the visit can be rescheduled for you.     Please contact the research coordinator if you receive care for your hypertension outside of your study visits.    If you have any questions, please contact the research coordinator at [phone]. If you experience any symptoms please call the [employer] 24/7 triage number at [phone]. If your phone number, email or address changes please alert the research coordinator.     In the meantime, we ask that you complete the online surveys emailed out to you, if completing online, or mailed out to you, if completing paper surveys, before your next visit.

## 2018-05-23 ENCOUNTER — ALLIED HEALTH/NURSE VISIT (OUTPATIENT)
Dept: FAMILY MEDICINE | Facility: CLINIC | Age: 48
End: 2018-05-23

## 2018-05-23 VITALS
WEIGHT: 206 LBS | HEART RATE: 85 BPM | HEIGHT: 65 IN | SYSTOLIC BLOOD PRESSURE: 118 MMHG | OXYGEN SATURATION: 98 % | BODY MASS INDEX: 34.32 KG/M2 | DIASTOLIC BLOOD PRESSURE: 86 MMHG

## 2018-05-23 DIAGNOSIS — I10 ESSENTIAL HYPERTENSION: ICD-10-CM

## 2018-05-23 PROCEDURE — 99213 OFFICE O/P EST LOW 20 MIN: CPT | Performed by: INTERNAL MEDICINE

## 2018-05-23 RX ORDER — VENLAFAXINE HYDROCHLORIDE 75 MG/1
75 CAPSULE, EXTENDED RELEASE ORAL
COMMUNITY

## 2018-05-23 RX ORDER — LISINOPRIL 20 MG/1
20 TABLET ORAL DAILY
Qty: 90 TABLET | Refills: 1 | Status: SHIPPED | OUTPATIENT
Start: 2018-05-23 | End: 2018-09-12

## 2018-05-23 NOTE — PATIENT INSTRUCTIONS
[facility] Hypertension Study  Visit 7     Thank you for your continued participation in the hypertension study!  Please continue taking your hypertension medications as directed. Your next visit will be in four months and will be scheduled for you in the coming days. After it is scheduled, be sure to let the research coordinator know as soon as possible if you cannot make it so that the visit can be rescheduled for you.     Please contact the research coordinator if you receive care for your hypertension outside of your study visits.    If you have any questions, please contact the research coordinator at [phone]. If you experience any symptoms please call the [employer] 24/7 triage number at [phone]. If your phone number, email or address changes please alert the research coordinator.     In the meantime, we ask that you complete the online surveys emailed out to you, if completing online, or mailed out to you, if completing paper surveys, before your next visit.

## 2018-05-23 NOTE — PROGRESS NOTES
PGEN study visit  NEW HYPERTENSION diagnosis visit 7    SUBJECTIVE:  Evans is here for 7th PGEN research study visit. Please refer to research tab in the header and today's flow sheet for further details. Patient had new onset hypertension at enrollment and has a goal of < 140/90 (per Problem list target chosen by PCP)     Prior research note reviewed. Patient is on blood pressure medication at this time.  She has been taking Lisinopril 20 mg daily and is tolerating the medication well.        Current diet & lifestyle: some walking, mindful of salt   Smoking: none  Alcohol consumption: none  Other pertinent history: none       BP Readings from Last 3 Encounters:   05/23/18 118/86   02/28/18 128/88   01/17/18 (!) 146/98       Current Outpatient Prescriptions   Medication Sig Dispense Refill     ARIPiprazole (ABILIFY) 15 MG tablet Take 1 tablet (15 mg) by mouth At Bedtime 30 tablet 1     buPROPion (WELLBUTRIN XL) 300 MG 24 hr tablet        lisinopril (PRINIVIL/ZESTRIL) 20 MG tablet Take 1 tablet (20 mg) by mouth daily 90 tablet 1     venlafaxine (EFFEXOR-XR) 75 MG 24 hr capsule Take 75 mg by mouth 3 pills every morning       [DISCONTINUED] lisinopril (PRINIVIL/ZESTRIL) 20 MG tablet Take 1 tablet (20 mg) by mouth daily 30 tablet 3     Potassium   Date Value Ref Range Status   11/08/2017 4.4 3.4 - 5.3 mmol/L Final     Creatinine   Date Value Ref Range Status   11/08/2017 0.84 0.52 - 1.04 mg/dL Final     Urea Nitrogen   Date Value Ref Range Status   11/08/2017 9 7 - 30 mg/dL Final     GFR Estimate   Date Value Ref Range Status   11/08/2017 73 >60 mL/min/1.7m2 Final     Comment:     Non  GFR Calc      A baseline potassium, creatinine, BUN, GFR has been done within past 12 months      OBJECTIVE:  Patient in in no apparent distress and able to provide full history for today's encounter. she denies pain or any current illness   Vitals: /86 (BP Location: Left arm, Patient Position: Chair, Cuff Size:  "Adult Large)  Pulse 85  Ht 5' 4.5\" (1.638 m)  Wt 206 lb (93.4 kg)  SpO2 98%  BMI 34.81 kg/m2  Today's BP completed using cuff size: large on left side  arm.      Pulse Readings from Last 1 Encounters:   05/23/18 85     Is pulse 55 or greater? - Yes    BMI= Body mass index is 34.81 kg/(m^2).  See [facility] for other exam findings.     Physical Exam   Constitutional: She is oriented to person, place, and time. No distress.   Neck: No thyromegaly present.   Cardiovascular: Normal rate, regular rhythm and normal heart sounds.    Pulmonary/Chest: Effort normal and breath sounds normal. No respiratory distress.   Musculoskeletal: She exhibits no edema.   Neurological: She is alert and oriented to person, place, and time. Coordination normal.   Nursing note and vitals reviewed.      ASSESSMENT/PLAN:   Blood pressure reading today is at the provider specified goal of <140/90.      [facility] Flow[facility] has been  'filed' ) for this visit (this saves flowsheet data)      1.  Based on [facility] HT[facility] MGMT standing order the patient will be advised continue current medication regimen unchanged.     2.  We will not be checking a metabolic lab panel today.      3.  Follow up instructions include:     Next Provider visit: Follow up in 4 months    See avs for more details.  Full research packet also provided to patient previously  Our research team will reach out to patient to schedule follow up visit as well.     Patient was counseled regarding the lifestyle changes (listed below)  to help with BP management Yes  Lifestyle changes that can help control high blood pressure:  Even though [facility] is a study to test effectiveness of genetically guided medications for managing high blood pressure, there are several things you can do to ensure your blood pressure stays in good control:    Maintain a healthy weight (BMI<26). A modest amount of weight loss can be helpful    Limit salt intake to under 2400mg daily    Follow the DASH diet " (lean meats, low salt, whole grains, lots of fruits/vegies)    Stay active, try to get in 30 minutes of exercise daily.    Manage your daily stress.    Do not smoke cigarettes (or cut back)    Limit alcohol (2 drinks/day for men, 1 drink/day for women)  Was AVS  provided to patient with the relevant <dot>PGENPI dot phrase pulled into patient instructions Yes    Ortega Mooney MD

## 2018-05-23 NOTE — MR AVS SNAPSHOT
After Visit Summary   5/23/2018    Evans Salinas    MRN: [id number]           Patient Information     Date Of Birth          1970        Visit Information        Provider Department      5/23/2018 9:30 AM Ortega Mooney MD [facility]        Today's Diagnoses     Essential hypertension          Care Instructions    PGen Hypertension Study  Visit 7     Thank you for your continued participation in the hypertension study!  Please continue taking your hypertension medications as directed. Your next visit will be in four months and will be scheduled for you in the coming days. After it is scheduled, be sure to let the research coordinator know as soon as possible if you cannot make it so that the visit can be rescheduled for you.     Please contact the research coordinator if you receive care for your hypertension outside of your study visits.    If you have any questions, please contact the research coordinator at [phone]. If you experience any symptoms please call the [facility] 24/7 triage number at [phone]. If your phone number, email or address changes please alert the research coordinator.     In the meantime, we ask that you complete the online surveys emailed out to you, if completing online, or mailed out to you, if completing paper surveys, before your next visit.          Follow-ups after your visit        Your next 10 appointments already scheduled     Sep 12, 2018  9:30 AM CDT   Nurse Only with Ortega Mooney [facility]   [facility]    [address] [facility] [id number] [phone]              Who to contact     If you have questions or need follow up information about today's clinic visit or your schedule please contact [facility] directly at [phone].  Normal or non-critical lab and imaging results will be communicated to you by MyChart, letter or phone within 4 business  "days after the clinic has received the results. If you do not hear from us within 7 days, please contact the clinic through [employer] or phone. If you have a critical or abnormal lab result, we will notify you by phone as soon as possible.  Submit refill requests through [employer] or call your pharmacy and they will forward the refill request to us. Please allow 3 business days for your refill to be completed.          Additional Information About Your Visit        [employer] Information     [employer] gives you secure access to your electronic health record. If you see a primary care provider, you can also send messages to your care team and make appointments. If you have questions, please call your primary care clinic.  If you do not have a primary care provider, please call [phone] and they will assist you.        Care EveryWhere ID     This is your Care EveryWhere ID. This could be used by other organizations to access your [facility] medical records  [id number]        Your Vitals Were     Pulse Height Pulse Oximetry BMI (Body Mass Index)          85 5' 4.5\" (1.638 m) 98% 34.81 kg/m2         Blood Pressure from Last 3 Encounters:   05/23/18 118/86   02/28/18 128/88   01/17/18 (!) 146/98    Weight from Last 3 Encounters:   05/23/18 206 lb (93.4 kg)   02/28/18 200 lb 12.8 oz (91.1 kg)   01/17/18 203 lb (92.1 kg)              Today, you had the following     No orders found for display         Where to get your medicines      These medications were sent to [id number]  [place], [place] - [address]  [address] [facility] [place] [address]     Phone:  [phone]     lisinopril 20 MG tablet          Primary Care Provider Office Phone # Fax #    [facility] [phone] [address] [place] [address]        Equal Access to Services     ARACELI OKEEFE AH: Hadii brandon martinezo Soomaali, waaxda luqadaha, qaybta kaalmada samuel reyes " ah. So [facility] [phone].    ATENCIÓN: Si mukesh concepcion, tiene a goldstein disposición servicios gratuitos de asistencia lingüística. Cachorro al [phone].    We comply with applicable federal civil rights laws and [place] laws. We do not discriminate on the basis of race, color, national origin, age, disability, sex, sexual orientation, or gender identity.            Thank you!     Thank you for choosing [facility]  for your care. Our goal is always to provide you with excellent care. Hearing back from our patients is one way we can continue to improve our services. Please take a few minutes to complete the written survey that you may receive in the mail after your visit with us. Thank you!             Your Updated Medication List - Protect others around you: Learn how to safely use, store and throw away your medicines at www.disposemymeds.org.          This list is accurate as of 5/23/18  9:55 AM.  Always use your most recent med list.                   Brand Name Dispense Instructions for use Diagnosis    ABILIFY 15 MG tablet   Generic drug:  ARIPiprazole     30 tablet    Take 1 tablet (15 mg) by mouth At Bedtime        buPROPion 300 MG 24 hr tablet    WELLBUTRIN XL          lisinopril 20 MG tablet    PRINIVIL/ZESTRIL    90 tablet    Take 1 tablet (20 mg) by mouth daily    Essential hypertension       venlafaxine 75 MG 24 hr capsule    EFFEXOR-XR     Take 75 mg by mouth 3 pills every morning

## 2018-09-12 ENCOUNTER — ALLIED HEALTH/NURSE VISIT (OUTPATIENT)
Dept: FAMILY MEDICINE | Facility: CLINIC | Age: 48
End: 2018-09-12
Payer: COMMERCIAL

## 2018-09-12 VITALS
BODY MASS INDEX: 34.82 KG/M2 | SYSTOLIC BLOOD PRESSURE: 126 MMHG | OXYGEN SATURATION: 98 % | HEART RATE: 82 BPM | WEIGHT: 209 LBS | TEMPERATURE: 97.5 F | HEIGHT: 65 IN | DIASTOLIC BLOOD PRESSURE: 82 MMHG

## 2018-09-12 DIAGNOSIS — I10 ESSENTIAL HYPERTENSION: ICD-10-CM

## 2018-09-12 PROCEDURE — 99213 OFFICE O/P EST LOW 20 MIN: CPT | Performed by: INTERNAL MEDICINE

## 2018-09-12 RX ORDER — LISINOPRIL 20 MG/1
20 TABLET ORAL DAILY
Qty: 90 TABLET | Refills: 1 | Status: SHIPPED | OUTPATIENT
Start: 2018-09-12 | End: 2018-12-20

## 2018-09-12 ASSESSMENT — ENCOUNTER SYMPTOMS
NUMBNESS: 0
PALPITATIONS: 0
SHORTNESS OF BREATH: 0
HEADACHES: 0
VOMITING: 0
LIGHT-HEADEDNESS: 0
WEAKNESS: 0
ABDOMINAL PAIN: 0
FATIGUE: 0
COUGH: 0
NAUSEA: 0
DIZZINESS: 0

## 2018-09-12 NOTE — MR AVS SNAPSHOT
After Visit Summary   9/12/2018    Evans Salinas    MRN: [id number]           Patient Information     Date Of Birth          1970        Visit Information        Provider Department      9/12/2018 9:30 AM Ortega Mooney MD [facility]        Today's Diagnoses     Essential hypertension          Care Instructions    PGen Hypertension Study  Visit 8     Thank you for your participation in the hypertension study!     Please continue taking your hypertension medications as directed and follow up with your physician as directed. If you have any questions, please contact your physician s office.    You have been given the results of your genetic profile for your own records.  Please contact the research coordinator at [phone] if you have any questions related to the study.      Please contact your doctor/provider with any other health related questions.           Follow-ups after your visit        Who to contact     If you have questions or need follow up information about today's clinic visit or your schedule please contact [facility] directly at [phone].  Normal or non-critical lab and imaging results will be communicated to you by [employer], letter or phone within 4 business days after the clinic has received the results. If you do not hear from us within 7 days, please contact the clinic through [employer] or phone. If you have a critical or abnormal lab result, we will notify you by phone as soon as possible.  Submit refill requests through [facility] or call your pharmacy and they will forward the refill request to us. Please allow 3 business days for your refill to be completed.          Additional Information About Your Visit        MyChart Information     [facility] gives you secure access to your electronic health record. If you see a primary care provider, you can also send messages to your care team and make appointments. If you have questions,  "please call your primary care clinic.  If you do not have a primary care provider, please call [phone] and they will assist you.        Care EveryWhere ID     This is your Care EveryWhere ID. This could be used by other organizations to access your [facility] medical records  [id number]        Your Vitals Were     Pulse Temperature Height Pulse Oximetry BMI (Body Mass Index)       82 97.5  F (36.4  C) (Oral) 5' 4.5\" (1.638 m) 98% 35.32 kg/m2        Blood Pressure from Last 3 Encounters:   09/12/18 126/82   05/23/18 118/86   02/28/18 128/88    Weight from Last 3 Encounters:   09/12/18 209 lb (94.8 kg)   05/23/18 206 lb (93.4 kg)   02/28/18 200 lb 12.8 oz (91.1 kg)              Today, you had the following     No orders found for display         Where to get your medicines      These medications were sent to [facility] [address] [place] [address] [address]  [address] [facility] [address]     Phone:  [phone]     lisinopril 20 MG tablet          Primary Care Provider Office Phone # Fax #    [facility] [phone] [address]  [facility] [address]        Equal Access to Services     ARACELI OKEEFE AH: Hadii aad ku hadasho Soomaali, waaxda luqadaha, qaybta kaalmada adeegyada, samuel guerra. So [facility] [phone].    ATENCIÓN: Si habla español, tiene a goldstein disposición servicios gratuitos de asistencia lingüística. Llame al [phone].    We comply with applicable federal civil rights laws and [place] laws. We do not discriminate on the basis of race, color, national origin, age, disability, sex, sexual orientation, or gender identity.            Thank you!     Thank you for choosing [facility]  for your care. Our goal is always to provide you with excellent care. Hearing back from our patients is one way we can continue to improve our services. Please take a few minutes to complete the written survey that you may " receive in the mail after your visit with us. Thank you!             Your Updated Medication List - Protect others around you: Learn how to safely use, store and throw away your medicines at www.disposemymeds.org.          This list is accurate as of 9/12/18 10:36 AM.  Always use your most recent med list.                   Brand Name Dispense Instructions for use Diagnosis    ABILIFY 15 MG tablet   Generic drug:  ARIPiprazole     30 tablet    Take 1 tablet (15 mg) by mouth At Bedtime        buPROPion 300 MG 24 hr tablet    WELLBUTRIN XL          lisinopril 20 MG tablet    PRINIVIL/ZESTRIL    90 tablet    Take 1 tablet (20 mg) by mouth daily    Essential hypertension       venlafaxine 75 MG 24 hr capsule    EFFEXOR-XR     Take 75 mg by mouth 3 pills every morning

## 2018-09-12 NOTE — PROGRESS NOTES
PGEN study visit  NEW HYPERTENSION diagnosis visit 8    SUBJECTIVE:  Evans is here for 8th(Final)  [facility] research study visit. Please refer to research tab in the header and today's flow sheet for further details. Patient had new onset hypertension at enrollment and has a goal of < 140/90 (per Problem list target chosen by PCP)     Prior research note reviewed. Patient is on blood pressure medication at this time.  She has been taking Lisinopril 20 mg daily and is tolerating the medication well.        Current diet & lifestyle: some walking, mindful of salt   Smoking: none  Alcohol consumption: none  Other pertinent history: none       BP Readings from Last 3 Encounters:   09/12/18 126/82   05/23/18 118/86   02/28/18 128/88       Current Outpatient Prescriptions   Medication Sig Dispense Refill     ARIPiprazole (ABILIFY) 15 MG tablet Take 1 tablet (15 mg) by mouth At Bedtime 30 tablet 1     buPROPion (WELLBUTRIN XL) 300 MG 24 hr tablet        lisinopril (PRINIVIL/ZESTRIL) 20 MG tablet Take 1 tablet (20 mg) by mouth daily 90 tablet 1     venlafaxine (EFFEXOR-XR) 75 MG 24 hr capsule Take 75 mg by mouth 3 pills every morning       [DISCONTINUED] lisinopril (PRINIVIL/ZESTRIL) 20 MG tablet Take 1 tablet (20 mg) by mouth daily 90 tablet 1     Potassium   Date Value Ref Range Status   11/08/2017 4.4 3.4 - 5.3 mmol/L Final     Creatinine   Date Value Ref Range Status   11/08/2017 0.84 0.52 - 1.04 mg/dL Final     Urea Nitrogen   Date Value Ref Range Status   11/08/2017 9 7 - 30 mg/dL Final     GFR Estimate   Date Value Ref Range Status   11/08/2017 73 >60 mL/min/1.7m2 Final     Comment:     Non  GFR Calc      A baseline potassium, creatinine, BUN, GFR has been done within past 12 months      Review of Systems   Constitutional: Negative for fatigue.   Eyes: Negative for visual disturbance.   Respiratory: Negative for cough and shortness of breath.    Cardiovascular: Negative for chest pain, palpitations and  "leg swelling.   Gastrointestinal: Negative for abdominal pain, nausea and vomiting.   Neurological: Negative for dizziness, weakness, light-headedness, numbness and headaches.       OBJECTIVE:  Patient in in no apparent distress and able to provide full history for today's encounter. she denies pain or any current illness   Vitals: /82 (BP Location: Left arm, Patient Position: Sitting, Cuff Size: Adult Large)  Pulse 82  Temp 97.5  F (36.4  C) (Oral)  Ht 5' 4.5\" (1.638 m)  Wt 209 lb (94.8 kg)  SpO2 98%  BMI 35.32 kg/m2  Today's BP completed using cuff size: large on left side arm.      Pulse Readings from Last 1 Encounters:   09/12/18 82     Is pulse 55 or greater? - Yes    BMI= Body mass index is 35.32 kg/(m^2).  [facility] for other [facility] findings.    Physical Exam   Constitutional: She is oriented to person, place, and time. No distress.   Neck: No thyromegaly present.   Cardiovascular: Normal rate, regular rhythm and normal heart sounds.    Pulmonary/Chest: Effort normal and breath sounds normal. No respiratory distress.   Musculoskeletal: She exhibits no edema.   Neurological: She is alert and oriented to person, place, and time. Coordination normal.   Nursing note and vitals reviewed.      ASSESSMENT/PLAN:   Blood pressure reading today is at the provider specified goal of <140/90.      [facility]  '[facility]' ) for this visit (this saves flowsheet data)      1.  Based on PGEN RN HTN MGMT standing order the patient will be advised continue current medication regimen unchanged.     2.  We will not be checking a metabolic lab panel today.      3.  Follow up instructions include: Patient advised to establish with PCP for further refills of her lisinopril and for monitoring her hypertension    See avs for more details.  Full research packet also provided to patient previously  Our research team will reach out to patient to schedule follow up visit as well.     Patient was counseled " regarding the lifestyle changes (listed below)  to help with BP management Yes  Lifestyle changes that can help control high blood pressure:  Even though PGEN is a study to test effectiveness of genetically guided medications for managing high blood pressure, there are several things you can do to ensure your blood pressure stays in good control:    Maintain a healthy weight (BMI<26). A modest amount of weight loss can be helpful    Limit salt intake to under 2400mg daily    Follow the DASH diet (lean meats, low salt, whole grains, lots of fruits/vegies)    Stay active, try to get in 30 minutes of exercise daily.    Manage your daily stress.    Do not smoke cigarettes (or cut back)    Limit alcohol (2 drinks/day for men, 1 drink/day for women)  Was AVS  provided to patient with the relevant <dot>PGENPI dot phrase pulled into patient instructions Yes    Patient was advised to follow up with PCP to continue ongoing care of HTN since study visits are now complete.        Joni Pablo MD

## 2018-09-12 NOTE — PATIENT INSTRUCTIONS
[facility] Hypertension Study  Visit 8     Thank you for your participation in the hypertension study!     Please continue taking your hypertension medications as directed and follow up with your physician as directed. If you have any questions, please contact your physician s office.    You have been given the results of your genetic profile for your own records.  Please contact the research coordinator at [phone] if you have any questions related to the study.      Please contact your doctor/provider with any other health related questions.

## 2018-12-20 DIAGNOSIS — I10 ESSENTIAL HYPERTENSION: ICD-10-CM

## 2018-12-20 NOTE — TELEPHONE ENCOUNTER
Dr. Mooney,   See below, please advise   Noted patient was part of the PGEN study and completed it in September, now requesting additional refill (future visit not with [facility] provider)     Cindy LOPEZ RN

## 2018-12-20 NOTE — TELEPHONE ENCOUNTER
Reason for Call:  Medication or medication refill:    Do you use a [facility]?  Name of the pharmacy and phone number for the current request:  [id number] - [place], [place] - [address]      Name of the medication requested:lisinopril (PRINIVIL/ZESTRIL) 20 MG tablet    Other request: Pt will run out of medication prior to 1/4/19 when appt w/new PCP is.  Pt is requesting 30 day supply of med to get her through until can establish with PCP.      Can we leave a detailed message on this number? YES    Phone number patient can be reached at: Home number on file [phone] (home)    Best Time: any    Call taken on 12/20/2018 at 4:12 PM by Lashon Vargas

## 2018-12-21 RX ORDER — LISINOPRIL 20 MG/1
20 TABLET ORAL DAILY
Qty: 30 TABLET | Refills: 0 | Status: SHIPPED | OUTPATIENT
Start: 2018-12-21

## 2019-02-19 ENCOUNTER — TRANSFERRED RECORDS (OUTPATIENT)
Dept: HEALTH INFORMATION MANAGEMENT | Facility: CLINIC | Age: 49
End: 2019-02-19

## 2019-11-04 ENCOUNTER — HEALTH MAINTENANCE LETTER (OUTPATIENT)
Age: 49
End: 2019-11-04

## 2020-06-24 ENCOUNTER — APPOINTMENT (OUTPATIENT)
Dept: GENERAL RADIOLOGY | Facility: CLINIC | Age: 50
End: 2020-06-24
Attending: EMERGENCY MEDICINE
Payer: COMMERCIAL

## 2020-06-24 ENCOUNTER — HOSPITAL ENCOUNTER (EMERGENCY)
Facility: CLINIC | Age: 50
Discharge: HOME | End: 2020-06-24
Attending: EMERGENCY MEDICINE | Admitting: EMERGENCY MEDICINE
Payer: COMMERCIAL

## 2020-06-24 VITALS
HEART RATE: 83 BPM | SYSTOLIC BLOOD PRESSURE: 131 MMHG | RESPIRATION RATE: 18 BRPM | OXYGEN SATURATION: 99 % | DIASTOLIC BLOOD PRESSURE: 88 MMHG | TEMPERATURE: 98.1 F

## 2020-06-24 DIAGNOSIS — R10.9 FLANK PAIN: ICD-10-CM

## 2020-06-24 DIAGNOSIS — R06.00 DYSPNEA, UNSPECIFIED TYPE: ICD-10-CM

## 2020-06-24 DIAGNOSIS — R94.31 ABNORMAL ELECTROCARDIOGRAM: ICD-10-CM

## 2020-06-24 LAB
ALBUMIN UR-MCNC: NEGATIVE MG/DL
ANION GAP SERPL CALCULATED.3IONS-SCNC: 8 MMOL/L (ref 3–14)
APPEARANCE UR: CLEAR
BASOPHILS # BLD AUTO: 0.1 10E9/L (ref 0–0.2)
BASOPHILS NFR BLD AUTO: 1.1 %
BILIRUB UR QL STRIP: NEGATIVE
BUN SERPL-MCNC: 10 MG/DL (ref 7–30)
CALCIUM SERPL-MCNC: 8.9 MG/DL (ref 8.5–10.1)
CHLORIDE SERPL-SCNC: 108 MMOL/L (ref 94–109)
CO2 SERPL-SCNC: 24 MMOL/L (ref 20–32)
COLOR UR AUTO: ABNORMAL
CREAT SERPL-MCNC: 0.91 MG/DL (ref 0.52–1.04)
D DIMER PPP FEU-MCNC: <0.3 UG/ML FEU (ref 0–0.5)
DIFFERENTIAL METHOD BLD: NORMAL
EOSINOPHIL # BLD AUTO: 0.3 10E9/L (ref 0–0.7)
EOSINOPHIL NFR BLD AUTO: 3 %
ERYTHROCYTE [DISTWIDTH] IN BLOOD BY AUTOMATED COUNT: 12.9 % (ref 10–15)
GFR SERPL CREATININE-BSD FRML MDRD: 73 ML/MIN/{1.73_M2}
GLUCOSE SERPL-MCNC: 97 MG/DL (ref 70–99)
GLUCOSE UR STRIP-MCNC: NEGATIVE MG/DL
HCT VFR BLD AUTO: 38 % (ref 35–47)
HGB BLD-MCNC: 12.3 G/DL (ref 11.7–15.7)
HGB UR QL STRIP: NEGATIVE
IMM GRANULOCYTES # BLD: 0.1 10E9/L (ref 0–0.4)
IMM GRANULOCYTES NFR BLD: 0.7 %
KETONES UR STRIP-MCNC: NEGATIVE MG/DL
LEUKOCYTE ESTERASE UR QL STRIP: ABNORMAL
LYMPHOCYTES # BLD AUTO: 3.4 10E9/L (ref 0.8–5.3)
LYMPHOCYTES NFR BLD AUTO: 34.2 %
MCH RBC QN AUTO: 29.1 PG (ref 26.5–33)
MCHC RBC AUTO-ENTMCNC: 32.4 G/DL (ref 31.5–36.5)
MCV RBC AUTO: 90 FL (ref 78–100)
MONOCYTES # BLD AUTO: 0.7 10E9/L (ref 0–1.3)
MONOCYTES NFR BLD AUTO: 6.7 %
NEUTROPHILS # BLD AUTO: 5.3 10E9/L (ref 1.6–8.3)
NEUTROPHILS NFR BLD AUTO: 54.3 %
NITRATE UR QL: NEGATIVE
NRBC # BLD AUTO: 0 10*3/UL
NRBC BLD AUTO-RTO: 0 /100
PH UR STRIP: 5.5 PH (ref 5–7)
PLATELET # BLD AUTO: 341 10E9/L (ref 150–450)
PLATELET # BLD EST: NORMAL 10*3/UL
POTASSIUM SERPL-SCNC: 3.8 MMOL/L (ref 3.4–5.3)
RBC # BLD AUTO: 4.23 10E12/L (ref 3.8–5.2)
RBC #/AREA URNS AUTO: 1 /HPF (ref 0–2)
RBC MORPH BLD: NORMAL
SODIUM SERPL-SCNC: 140 MMOL/L (ref 133–144)
SOURCE: ABNORMAL
SP GR UR STRIP: 1.01 (ref 1–1.03)
SQUAMOUS #/AREA URNS AUTO: 5 /HPF (ref 0–1)
TRANS CELLS #/AREA URNS HPF: <1 /HPF (ref 0–1)
TROPONIN I SERPL-MCNC: <0.015 UG/L (ref 0–0.04)
UROBILINOGEN UR STRIP-MCNC: NORMAL MG/DL (ref 0–2)
WBC # BLD AUTO: 9.8 10E9/L (ref 4–11)
WBC #/AREA URNS AUTO: 4 /HPF (ref 0–5)

## 2020-06-24 PROCEDURE — 93005 ELECTROCARDIOGRAM TRACING: CPT

## 2020-06-24 PROCEDURE — 85379 FIBRIN DEGRADATION QUANT: CPT | Performed by: EMERGENCY MEDICINE

## 2020-06-24 PROCEDURE — 84484 ASSAY OF TROPONIN QUANT: CPT | Performed by: EMERGENCY MEDICINE

## 2020-06-24 PROCEDURE — 71045 X-RAY EXAM CHEST 1 VIEW: CPT

## 2020-06-24 PROCEDURE — 99285 EMERGENCY DEPT VISIT HI MDM: CPT

## 2020-06-24 PROCEDURE — 25000132 ZZH RX MED GY IP 250 OP 250 PS 637: Performed by: EMERGENCY MEDICINE

## 2020-06-24 PROCEDURE — 85025 COMPLETE CBC W/AUTO DIFF WBC: CPT | Performed by: EMERGENCY MEDICINE

## 2020-06-24 PROCEDURE — 81001 URINALYSIS AUTO W/SCOPE: CPT | Performed by: EMERGENCY MEDICINE

## 2020-06-24 PROCEDURE — 80048 BASIC METABOLIC PNL TOTAL CA: CPT | Performed by: EMERGENCY MEDICINE

## 2020-06-24 RX ORDER — IBUPROFEN 600 MG/1
600 TABLET, FILM COATED ORAL ONCE
Status: COMPLETED | OUTPATIENT
Start: 2020-06-24 | End: 2020-06-24

## 2020-06-24 RX ORDER — ASPIRIN 325 MG
325 TABLET ORAL ONCE
Status: COMPLETED | OUTPATIENT
Start: 2020-06-24 | End: 2020-06-24

## 2020-06-24 RX ADMIN — ASPIRIN 325 MG ORAL TABLET 325 MG: 325 PILL ORAL at 20:27

## 2020-06-24 RX ADMIN — IBUPROFEN 600 MG: 600 TABLET ORAL at 19:04

## 2020-06-24 ASSESSMENT — ENCOUNTER SYMPTOMS
FEVER: 0
NAUSEA: 0
SHORTNESS OF BREATH: 1
HEMATURIA: 0
WEAKNESS: 0
MYALGIAS: 1
COUGH: 0
FREQUENCY: 0
VOMITING: 0
ABDOMINAL PAIN: 0
DIAPHORESIS: 1
DYSURIA: 0

## 2020-06-24 NOTE — ED PROVIDER NOTES
History     Chief Complaint:  Shortness of breath    The history is provided by the patient.      Evans Salinas is a 49 year old female with a history of NICOLE on CPAP, hypertension, hyperlipidemia, DVT (remotely and related to pregnancy), and anxiety who presents to the ED for evaluation of shortness of breath, left flank chest pain.  The patient has gained quite a bit of weight over the past 6 months (she estimates 30 lbs).  Since gaining weight, she has ha decreased exercise tolerance.  This has been consistent over the course of several months and has not been progressive recently.  She has not yet had evaluation for her shortness of breath.  She is able to walk outdoors 20-30 minutes with her  and does at times feel winded but is not having to stop to rest.  The patient also reports that she was playing pool with her  last night and suddenly started to feel hot and sweaty- no associated CP or SOB at that time. Two days ago around midnight her forearm started aching last Monday night at approximately 0000. She tried to alleviate the pain using hot water without any relief.  The patient presents today because she started having some sharp, pleuritic left lower flank pain.  Pain is worse with movement and patient did have some right periscapular pain that resolved.  No cough or fevers.  No hemoptysis.  No PND.  Recently diagnosed with NICOLE and using CPAP. The patient called [facility] today regarding her symptoms and was referred here for further evaluation. In the ED, the patient reports currently feeling hot and sweaty. She denies any cough, hemoptysis, fever, vomiting, abdominal pain, weakness, or urinary issues. She also denies any recent use of medications for her symptoms.  No recent falls or changes in activity. Of note, the patient reports gaining approximately 18 pounds in the last few months and has a history of DVT 10 years ago post pregnancy.    Cardiac/PE/DVT Risk Factors:  History of  hypertension - Yes  History of hyperlipidemia - Yes  History of diabetes - No  History of smoking - No  Personal history of PE/DVT - Yes  Family history of PE/DVT - No  Family history of heart complications - No  Recent travel - No  Recent surgery - No  Other immobilizations - No  Cancer - No     Allergies:  No Known Allergies     Medications:    Abilify  Wellbutrin XL  Prinivil  Effexor-XR  Hydrochlorathiazide      Past Medical History:    Hypertension  DVT  Hyperlipidemia  Anxiety  Depression  Adenomatous polyp of ascending colon  Seizures  Temporomandibular joint disorder  Dyspnea on exertion  Sleep Apnea    Past Surgical History:    D&C   x2  [place] Teeth Extraction  Removal of right arm lipoma  Laparoscopic cholecystectomy  Colonoscopy x 2    Family History:    Mother: hypertension    Social History:  Smoking status: former smoker  Alchol use: small glass of wine with dinner  Negative for drug use.  Marital Status:   [2]     Review of Systems   Constitutional: Positive for diaphoresis. Negative for fever.        Feels hot   Respiratory: Positive for shortness of breath. Negative for cough.         - Hemoptysis   Cardiovascular: Positive for chest pain (lower left side).   Gastrointestinal: Negative for abdominal pain, nausea and vomiting.   Genitourinary: Negative for dysuria, frequency and hematuria.   Musculoskeletal: Positive for myalgias (left forearm).   Neurological: Negative for weakness.   All other systems reviewed and are negative.      Physical Exam     Patient Vitals for the past 24 hrs:   BP Temp Temp src Heart Rate Resp SpO2   20 1804 (!) 152/100 98.1  F (36.7  C) Oral 91 18 99 %       Physical Exam  Gen: alert  HEENT: PERRL, oropharynx clear  Neck: normal ROM  CV: RRR, no murmurs, 2+ distal pulses in all 4 extremities  Chest: no tenderness over the chest wall  Pulm: breath sounds equal, lungs clear  Abd: Soft, nontender  Back: no evidence of injury  MSK: no lower extremity edema,  no calf tenderness  Skin: no rash  Neuro: alert, appropriate conversation and interaction    Emergency Department Course     ECG:  Indication: shortness of breath  Time: 1851  Vent. Rate 75 bpm. KS interval 160. QRS duration 96. QT/QTc 424/473. P-R-T axis 51 28 42.  Sinus rhythm. Low voltage QRS. Nonspecific T wave abnormality. Prolonged QT. Abnormal ECG. Read at 1900 by Dr. Jackie Hoff.    Imaging:  Radiology findings were communicated with the patient who voiced understanding of the findings.    XR Chest Portable 1 View:   Negative chest as per radiology.     Laboratory:  Laboratory findings were communicated with the patient who voiced understanding of the findings.    CBC: WBC: 9.8, HGB: 12.3, PLT: 341  BMP: All WNL (Creatinine: 0.91)  1839 D dimer: <0.3  1839 Troponin I: <0.015    UA with microscopic: Leukocyte esterase trace (A), Squamous Epithelial 5 (H), o/w WNL     Interventions:  1821 Advil 600 mg oral  Asa 325    Emergency Department Course:  Past medical records, nursing notes, and vitals reviewed.    1811 I performed an exam of the patient as documented above.      EKG obtained in the ED, see results above.       IV was inserted and blood was drawn for laboratory testing, results above.     The patient provided a urine sample here in the emergency department. This  was sent for laboratory testing, findings above.      2021 I rechecked the patient and discussed the results of her workup thus far.     Findings and plan explained to the Patient. Patient discharged home with instructions regarding supportive care, medications, and reasons to return. The importance of close follow-up was reviewed.    I personally reviewed the laboratory  and imaging results with the Patient and answered all related questions prior to discharge.     Impression & Plan      Medical Decision Making:  Patient presents for pleuritic left flank pain.  UA is neg and no urianry sx therefore doubt UTI or ureteral stone.  No  associated abdominal pain or abdominal tenderness.  Patient is low risk for PE.  Ddimer was obtained and negative, which I felt was sufficient to exclude PE as the cause of her symptoms.  CXR was negative for PNA or PTX.    Patient also notes several months of exertional shortness of breath.  This is not newly progressive over the past several days.  Anginal equivalent was considered.  EKG showed T-wave inversion in V2 and V3 and no prior comparison available.  Troponin is negative today.  Given that she is not having new CP or new exertional symptoms and that she presented to the ED for new, nonexertional pleuritic flank pain, I felt that outpatient stress testing was appropriate.  Recommended daily Asprin and outpatient stress testing with close PCP follow up.  I stressed the need for return to ED for new chest pain or change in her shortness of breath.  Pt expressed understanding and understands need for stress test and follow up.        Diagnosis:    ICD-10-CM    1. Abnormal electrocardiogram  R94.31 CBC + differential     Basic metabolic panel     UA with Microscopic     Troponin I     D dimer quantitative   2. Flank pain  R10.9    3. Dyspnea, unspecified type  R06.00 Echo Stress Echocardiogram       Disposition:  Discharged to home.    Discharge Medications: recommended start asa daily, no new Rx  New Prescriptions    No medications on file       Scribe Disclosure:  I, Ever Bentley, am serving as a scribe at 6:10 PM on 6/24/2020 to document services personally performed by Jackie Hoff MD based on my observations and the provider's statements to me.   I, Doyle Her, am serving as a scribe at 6:54 PM on 6/24/2020 to document services personally performed by Jackie Hoff MD based on my observations and the provider's statements to me.          Jackie Hoff MD  06/24/20 2028

## 2020-06-24 NOTE — ED TRIAGE NOTES
Pt presents with L sided flank pain, L arm pain starting on Monday. Pt states also yesterday had an episode where pt was sweating playing pool and SOB. States some SOB now. Called nurse triage line who referred her here. ABCs intact.

## 2020-06-24 NOTE — ED AVS SNAPSHOT
[facility] Emergency Department  [address]  [facility] [address]  Phone:  [phone]  Fax:  [phone]                                    Evans Salinas   MRN: [id number]    Department:  [facility] Emergency Department   Date of Visit:  6/24/2020           After Visit Summary Signature Page    I have received my discharge instructions, and my questions have been answered. I have discussed any challenges I see with this plan with the nurse or doctor.    ..........................................................................................................................................  Patient/Patient Representative Signature      ..........................................................................................................................................  Patient Representative Print Name and Relationship to Patient    ..................................................               ................................................  Date                                   Time    ..........................................................................................................................................  Reviewed by Signature/Title    ...................................................              ..............................................  Date                                               Time          22EPIC Rev 08/18

## 2020-06-25 LAB — INTERPRETATION ECG - MUSE: NORMAL

## 2020-06-25 NOTE — DISCHARGE INSTRUCTIONS
An outpatient stress test was ordered for you.  The cardiac testing department will call you to schedule this in the next several days.  Please follow up on this result with your primary care doctor.    Discharge Instructions  Chest Pain/Shortness of Breath    You have been seen today for chest pain or discomfort.  At this time, your provider has found no signs that your chest pain is due to a serious or life-threatening condition, (or you have declined more testing and/or admission to the hospital). However, sometimes there is a serious problem that does not show up right away. Your evaluation today may not be complete and you may need further testing and evaluation.     Generally, every Emergency Department visit should have a follow-up clinic visit with either a primary or a specialty clinic/provider. Please follow-up as instructed by your emergency provider today.  Return to the Emergency Department if:  Your chest pain changes, gets worse, starts to happen more often, or comes with less activity.  You are newly short of breath.  You get very weak or tired.  You pass out or faint.  You have any new symptoms, like fever, cough, numb legs, or you cough up blood.  You have anything else that worries you.    Until you follow-up with your regular provider, please do the following:  Take one aspirin daily unless you have an allergy or are told not to by your provider.  If a stress test appointment has been made, go to the appointment.  If you have questions, contact your regular provider.  Follow-up with your regular provider/clinic as directed; this is very important.    If you were given a prescription for medicine here today, be sure to read all of the information (including the package insert) that comes with your prescription.  This will include important information about the medicine, its side effects, and any warnings that you need to know about.  The pharmacist who fills the prescription can provide more  information and answer questions you may have about the medicine.  If you have questions or concerns that the pharmacist cannot address, please call or return to the Emergency Department.       Remember that you can always come back to the Emergency Department if you are not able to see your regular provider in the amount of time listed above, if you get any new symptoms, or if there is anything that worries you.

## 2020-06-26 ENCOUNTER — HOSPITAL ENCOUNTER (OUTPATIENT)
Dept: CARDIOLOGY | Facility: CLINIC | Age: 50
Discharge: HOME | End: 2020-06-26
Attending: EMERGENCY MEDICINE | Admitting: EMERGENCY MEDICINE
Payer: COMMERCIAL

## 2020-06-26 DIAGNOSIS — R06.00 DYSPNEA, UNSPECIFIED TYPE: ICD-10-CM

## 2020-06-26 PROCEDURE — 93018 CV STRESS TEST I&R ONLY: CPT | Performed by: INTERNAL MEDICINE

## 2020-06-26 PROCEDURE — 40000264 ECHO STRESS ECHOCARDIOGRAM

## 2020-06-26 PROCEDURE — 93325 DOPPLER ECHO COLOR FLOW MAPG: CPT | Mod: 26 | Performed by: INTERNAL MEDICINE

## 2020-06-26 PROCEDURE — 93350 STRESS TTE ONLY: CPT | Mod: 26 | Performed by: INTERNAL MEDICINE

## 2020-06-26 PROCEDURE — 93321 DOPPLER ECHO F-UP/LMTD STD: CPT | Mod: 26 | Performed by: INTERNAL MEDICINE

## 2020-06-26 PROCEDURE — 25500064 ZZH RX 255 OP 636: Performed by: EMERGENCY MEDICINE

## 2020-06-26 PROCEDURE — 93016 CV STRESS TEST SUPVJ ONLY: CPT | Performed by: INTERNAL MEDICINE

## 2020-06-26 RX ADMIN — HUMAN ALBUMIN MICROSPHERES AND PERFLUTREN 3 ML: 10; .22 INJECTION, SOLUTION INTRAVENOUS at 11:52

## 2020-11-16 ENCOUNTER — HEALTH MAINTENANCE LETTER (OUTPATIENT)
Age: 50
End: 2020-11-16

## 2021-02-07 ENCOUNTER — HEALTH MAINTENANCE LETTER (OUTPATIENT)
Age: 51
End: 2021-02-07

## 2021-05-30 ENCOUNTER — RECORDS - HEALTHEAST (OUTPATIENT)
Dept: ADMINISTRATIVE | Facility: CLINIC | Age: 51
End: 2021-05-30

## 2021-09-18 ENCOUNTER — HEALTH MAINTENANCE LETTER (OUTPATIENT)
Age: 51
End: 2021-09-18

## 2022-01-08 ENCOUNTER — HEALTH MAINTENANCE LETTER (OUTPATIENT)
Age: 52
End: 2022-01-08

## 2022-03-05 ENCOUNTER — HEALTH MAINTENANCE LETTER (OUTPATIENT)
Age: 52
End: 2022-03-05

## 2022-11-20 ENCOUNTER — HEALTH MAINTENANCE LETTER (OUTPATIENT)
Age: 52
End: 2022-11-20

## 2023-04-15 ENCOUNTER — HEALTH MAINTENANCE LETTER (OUTPATIENT)
Age: 53
End: 2023-04-15

## 2024-10-16 NOTE — PROGRESS NOTES
"HPI      SUBJECTIVE:   Evans Salinas is a 47 year old female who presents to clinic today for the following health issues:      Chief Complaint   Patient presents with     PGen Study     2nd visit         {additional problems for provider to add:760746}    Problem list and histories reviewed & adjusted, as indicated.  Additional history: {NONE - AS DOCUMENTED:184594::\"as documented\"}    {HIST REVIEW/ LINKS 2:096631}    Reviewed and updated as needed this visit by clinical staff  Tobacco  Meds  Soc Hx      Reviewed and updated as needed this visit by Provider         {PROVIDER CHARTING PREFERENCE:882532}    ROS      Physical Exam      " [Consultation] : a consultation visit [FreeTextEntry1] : Osteoporosis; Natalie Pack DO